# Patient Record
Sex: MALE | Race: OTHER | NOT HISPANIC OR LATINO | Employment: FULL TIME | URBAN - NONMETROPOLITAN AREA
[De-identification: names, ages, dates, MRNs, and addresses within clinical notes are randomized per-mention and may not be internally consistent; named-entity substitution may affect disease eponyms.]

---

## 2023-06-22 ENCOUNTER — OFFICE VISIT (OUTPATIENT)
Dept: PRIMARY CARE | Facility: CLINIC | Age: 53
End: 2023-06-22
Payer: COMMERCIAL

## 2023-06-22 VITALS
BODY MASS INDEX: 31.32 KG/M2 | DIASTOLIC BLOOD PRESSURE: 78 MMHG | WEIGHT: 188 LBS | HEART RATE: 59 BPM | OXYGEN SATURATION: 98 % | SYSTOLIC BLOOD PRESSURE: 128 MMHG | HEIGHT: 65 IN

## 2023-06-22 DIAGNOSIS — K21.9 GASTROESOPHAGEAL REFLUX DISEASE WITHOUT ESOPHAGITIS: Primary | ICD-10-CM

## 2023-06-22 DIAGNOSIS — E78.5 HYPERLIPIDEMIA LDL GOAL <100: ICD-10-CM

## 2023-06-22 DIAGNOSIS — R07.89 OTHER CHEST PAIN: ICD-10-CM

## 2023-06-22 PROBLEM — R73.9 HYPERGLYCEMIA: Status: ACTIVE | Noted: 2023-06-22

## 2023-06-22 PROBLEM — R07.9 CHEST PAIN: Status: ACTIVE | Noted: 2023-06-22

## 2023-06-22 PROCEDURE — 1036F TOBACCO NON-USER: CPT | Performed by: INTERNAL MEDICINE

## 2023-06-22 PROCEDURE — 99214 OFFICE O/P EST MOD 30 MIN: CPT | Performed by: INTERNAL MEDICINE

## 2023-06-22 RX ORDER — FAMOTIDINE 20 MG/1
20 TABLET, FILM COATED ORAL 2 TIMES DAILY
Qty: 180 TABLET | Refills: 1 | Status: CANCELLED | OUTPATIENT
Start: 2023-06-22

## 2023-06-22 RX ORDER — MULTIVITAMIN
1 TABLET ORAL
COMMUNITY

## 2023-06-22 RX ORDER — OMEPRAZOLE 40 MG/1
40 CAPSULE, DELAYED RELEASE ORAL
Qty: 30 CAPSULE | Refills: 2 | Status: SHIPPED | OUTPATIENT
Start: 2023-06-22 | End: 2023-06-22 | Stop reason: SDUPTHER

## 2023-06-22 RX ORDER — OMEPRAZOLE 40 MG/1
40 CAPSULE, DELAYED RELEASE ORAL
Qty: 30 CAPSULE | Refills: 2 | Status: SHIPPED | OUTPATIENT
Start: 2023-06-22 | End: 2023-11-22 | Stop reason: SDUPTHER

## 2023-06-22 RX ORDER — ATORVASTATIN CALCIUM 10 MG/1
10 TABLET, FILM COATED ORAL DAILY
COMMUNITY
Start: 2023-04-14 | End: 2023-06-22 | Stop reason: SDUPTHER

## 2023-06-22 RX ORDER — ATORVASTATIN CALCIUM 10 MG/1
10 TABLET, FILM COATED ORAL DAILY
Qty: 90 TABLET | Refills: 1 | Status: SHIPPED | OUTPATIENT
Start: 2023-06-22 | End: 2023-06-22 | Stop reason: SDUPTHER

## 2023-06-22 RX ORDER — ATORVASTATIN CALCIUM 10 MG/1
10 TABLET, FILM COATED ORAL DAILY
Qty: 90 TABLET | Refills: 1 | Status: SHIPPED | OUTPATIENT
Start: 2023-06-22 | End: 2023-08-01

## 2023-06-22 RX ORDER — FAMOTIDINE 20 MG/1
20 TABLET, FILM COATED ORAL 2 TIMES DAILY
COMMUNITY
End: 2023-06-22

## 2023-06-22 RX ORDER — ALFUZOSIN HYDROCHLORIDE 10 MG/1
10 TABLET, EXTENDED RELEASE ORAL NIGHTLY
COMMUNITY
Start: 2023-01-13 | End: 2024-04-15 | Stop reason: WASHOUT

## 2023-06-22 ASSESSMENT — ENCOUNTER SYMPTOMS
FATIGUE: 0
NAUSEA: 0
SHORTNESS OF BREATH: 0
BLOOD IN STOOL: 0
ABDOMINAL PAIN: 0
PALPITATIONS: 0
COUGH: 0
VOMITING: 0
WHEEZING: 0
DIARRHEA: 0
CHEST TIGHTNESS: 0

## 2023-06-22 NOTE — ASSESSMENT & PLAN NOTE
-His symptoms appear to be more consistent with a gastrointestinal source due to his history of GERD and use of NSAIDs  -I am discontinuing his Pepcid and I will temporarily place him on omeprazole 40 mg twice daily  -He will not take any more NSAIDs  -We are scheduling him for stress test due to his family history and some risk factors

## 2023-06-22 NOTE — ASSESSMENT & PLAN NOTE
-We are providing a refill on his atorvastatin 10 mg daily  -He will go for a fasting lipid profile at his earliest convenience and we will go over the results when he returns

## 2023-06-22 NOTE — PROGRESS NOTES
Pt is here today for a 6 month check up. He was also in the ER on 6/17/23 with the C/O chest pain.

## 2023-06-22 NOTE — PATIENT INSTRUCTIONS
As we discussed I am ordering a stress test to make sure that your heart is okay and the staff will help you get that scheduled with instructions  Please refrain from taking any more NSAIDs which include medications like ibuprofen, Motrin, Advil, Aleve, Naprosyn, naproxen, or aspirin.  If you are needing something for pain you can take Tylenol which is also known as acetaminophen.  Please take it as directed on the box and do not take more than what is recommended  I am having you discontinue your famotidine and instead I sent a new prescription to your pharmacy for medication called omeprazole.  This is a much stronger acid reducing medication and at least temporarily I want you to take this twice a day  Please also remember to get your fasting cholesterol profile completed before your follow-up visit  I want to see you back in follow-up after your stress test

## 2023-06-22 NOTE — PROGRESS NOTES
Subjective   Patient ID: Luke Goldberg is a 52 y.o. male who presents for No chief complaint on file..  HPI  He is here today for follow-up after a recent visit to the emergency department.  He explains that in the wee hours of the night he awoke with chest pain and he described feeling tightness in the center part of his chest as well as laterally.  He states his shoulders also bother him and with deep breaths he was getting more pain.  He states he attempted to take some ibuprofen and aspirin but when he did not get better he appropriately went to the emergency department.  There they did several tests including EKG, chest x-ray, and multiple lab studies.  After being observed for for several hours and determined that it was not likely his heart and he was sent home with instructions to follow-up here.  We did conduct a review of systems and I did review his test results from the emergency department.  We discussed various possible causes of his chest pain and I do strongly suspect gastrointestinal causes.  I did explain however, because of his family history of heart disease I will be ordering a stress test for safe measure.  We are reminded that his father passed away suddenly from heart disease.  Were also reminded that he had a coronary calcium scan back on September 3, 2020 which had a composite score of 0.  He does have several risk factors for heart disease and he understands that that scan is not 100% predictive.  We talked about refraining from any more over-the-counter NSAIDs and I am going to change his Pepcid to omeprazole twice daily until his follow-up visit.  I will give him specific instructions to take with him today.  Review of Systems   Constitutional:  Negative for fatigue.   Respiratory:  Negative for cough, chest tightness, shortness of breath and wheezing.    Cardiovascular:  Negative for chest pain, palpitations and leg swelling.   Gastrointestinal:  Negative for abdominal pain, blood in  stool, diarrhea, nausea and vomiting.     Objective   Physical Exam  Vitals and nursing note reviewed.   Constitutional:       General: He is not in acute distress.     Appearance: Normal appearance.   HENT:      Head: Normocephalic and atraumatic.   Eyes:      Conjunctiva/sclera: Conjunctivae normal.   Cardiovascular:      Rate and Rhythm: Normal rate and regular rhythm.      Heart sounds: Normal heart sounds.   Pulmonary:      Effort: No respiratory distress.      Breath sounds: No wheezing.   Abdominal:      Palpations: Abdomen is soft.      Tenderness: There is no abdominal tenderness. There is no guarding.   Musculoskeletal:         General: No swelling. Normal range of motion.   Skin:     General: Skin is warm and dry.   Neurological:      General: No focal deficit present.      Mental Status: He is alert and oriented to person, place, and time.   Psychiatric:         Behavior: Behavior normal.       Recent Results (from the past 672 hour(s))   D-Dimer, Quantitative VTE Exclusion    Collection Time: 06/17/23  9:50 PM   Result Value Ref Range    D-DIMER, QUANTITATIVE VTE EXCLUSION 230 < or = 500 ng/mL FEU   Basic Metabolic Panel    Collection Time: 06/17/23  9:50 PM   Result Value Ref Range    Glucose 100 (H) 74 - 99 mg/dL    Sodium 137 136 - 145 mmol/L    Potassium 4.1 3.5 - 5.3 mmol/L    Chloride 104 98 - 107 mmol/L    Bicarbonate 26 21 - 32 mmol/L    Anion Gap 11 10 - 20 mmol/L    Urea Nitrogen 12 6 - 23 mg/dL    Creatinine 1.02 0.50 - 1.30 mg/dL    GFR MALE 87 >90 mL/min/1.73m2    Calcium 8.7 8.6 - 10.3 mg/dL   Troponin I, High Sensitivity    Collection Time: 06/17/23  9:50 PM   Result Value Ref Range    Troponin I <3 0 - 20 ng/L   CBC and Auto Differential    Collection Time: 06/17/23  9:50 PM   Result Value Ref Range    WBC 9.6 4.4 - 11.3 x10E9/L    RBC 4.43 (L) 4.50 - 5.90 x10E12/L    Hemoglobin 13.7 13.5 - 17.5 g/dL    Hematocrit 41.6 41.0 - 52.0 %    MCV 94 80 - 100 fL    MCHC 32.9 32.0 - 36.0 g/dL     Platelets 187 150 - 450 x10E9/L    RDW 12.0 11.5 - 14.5 %    Neutrophils % 68.6 40.0 - 80.0 %    Immature Granulocytes %, Automated 0.2 0.0 - 0.9 %    Lymphocytes % 17.7 13.0 - 44.0 %    Monocytes % 13.0 2.0 - 10.0 %    Eosinophils % 0.3 0.0 - 6.0 %    Basophils % 0.2 0.0 - 2.0 %    Neutrophils Absolute 6.60 1.20 - 7.70 x10E9/L    Lymphocytes Absolute 1.70 1.20 - 4.80 x10E9/L    Monocytes Absolute 1.25 (H) 0.10 - 1.00 x10E9/L    Eosinophils Absolute 0.03 0.00 - 0.70 x10E9/L    Basophils Absolute 0.02 0.00 - 0.10 x10E9/L   Troponin I, High Sensitivity    Collection Time: 06/17/23 10:44 PM   Result Value Ref Range    Troponin I <3 0 - 20 ng/L       Assessment/Plan   Problem List Items Addressed This Visit       Chest pain     -His symptoms appear to be more consistent with a gastrointestinal source due to his history of GERD and use of NSAIDs  -I am discontinuing his Pepcid and I will temporarily place him on omeprazole 40 mg twice daily  -He will not take any more NSAIDs  -We are scheduling him for stress test due to his family history and some risk factors         Relevant Orders    Nuclear Stress Test    Hyperlipidemia LDL goal <100     -We are providing a refill on his atorvastatin 10 mg daily  -He will go for a fasting lipid profile at his earliest convenience and we will go over the results when he returns         Relevant Medications    atorvastatin (Lipitor) 10 mg tablet    Other Relevant Orders    Lipid Panel    GERD (gastroesophageal reflux disease) - Primary     -We will discontinue Pepcid  -I will place him on omeprazole 40 mg twice daily until his return visit and then we may reduce to once daily depending his response         Relevant Medications    omeprazole (PriLOSEC) 40 mg DR robert Lau,

## 2023-06-22 NOTE — ASSESSMENT & PLAN NOTE
-We will discontinue Pepcid  -I will place him on omeprazole 40 mg twice daily until his return visit and then we may reduce to once daily depending his response

## 2023-06-26 NOTE — RESULT ENCOUNTER NOTE
I tried to call him about his CT scan results tonight but could not get through  If you could try to call him tomorrow and let him know that his stress test looked okay but he had 2 tiny nodules in the lung which are likely benign but I just want to make sure he follows up here on his scheduled appointment so that we can go into detail about these findings and possible need for follow-up.  Thank you

## 2023-07-13 ENCOUNTER — TELEPHONE (OUTPATIENT)
Dept: PRIMARY CARE | Facility: CLINIC | Age: 53
End: 2023-07-13

## 2023-07-13 ENCOUNTER — APPOINTMENT (OUTPATIENT)
Dept: PRIMARY CARE | Facility: CLINIC | Age: 53
End: 2023-07-13
Payer: COMMERCIAL

## 2023-07-13 DIAGNOSIS — R53.83 FATIGUE, UNSPECIFIED TYPE: Primary | ICD-10-CM

## 2023-07-13 NOTE — TELEPHONE ENCOUNTER
PT ASKING IF YOU WILL ADD A THYROID TO HIS LAB. HIS BROTHER WAS HAVING THE SAME TYPE OF CHEST PAIN AND IT WAS CAUSED BY HIS THYROID LEVEL BEING ABNORMAL.   Dosing Month 5 (Required For Cumulative Dosing): 30mg BID

## 2023-07-13 NOTE — TELEPHONE ENCOUNTER
"Please advise that unfortunately insurance will not cover a TSH with a diagnosis of \"chest pain \".  We will need to have something else such as \"fatigue \"or possibly \"hair loss \".  Does he have any other symptoms?  "

## 2023-07-24 ENCOUNTER — LAB (OUTPATIENT)
Dept: LAB | Facility: LAB | Age: 53
End: 2023-07-24
Payer: COMMERCIAL

## 2023-07-24 DIAGNOSIS — R53.83 FATIGUE, UNSPECIFIED TYPE: ICD-10-CM

## 2023-07-24 DIAGNOSIS — E78.5 HYPERLIPIDEMIA LDL GOAL <100: ICD-10-CM

## 2023-07-24 LAB
CHOLESTEROL (MG/DL) IN SER/PLAS: 185 MG/DL (ref 0–199)
CHOLESTEROL IN HDL (MG/DL) IN SER/PLAS: 40 MG/DL
CHOLESTEROL/HDL RATIO: 4.6
LDL: 95 MG/DL (ref 0–99)
NON HDL CHOLESTEROL: 145 MG/DL
THYROTROPIN (MIU/L) IN SER/PLAS BY DETECTION LIMIT <= 0.05 MIU/L: 3.14 MIU/L (ref 0.44–3.98)
TRIGLYCERIDE (MG/DL) IN SER/PLAS: 251 MG/DL (ref 0–149)
VLDL: 50 MG/DL (ref 0–40)

## 2023-07-24 PROCEDURE — 36415 COLL VENOUS BLD VENIPUNCTURE: CPT

## 2023-07-24 PROCEDURE — 84443 ASSAY THYROID STIM HORMONE: CPT

## 2023-07-24 PROCEDURE — 80061 LIPID PANEL: CPT

## 2023-07-25 ENCOUNTER — APPOINTMENT (OUTPATIENT)
Dept: PRIMARY CARE | Facility: CLINIC | Age: 53
End: 2023-07-25
Payer: COMMERCIAL

## 2023-08-01 ENCOUNTER — OFFICE VISIT (OUTPATIENT)
Dept: PRIMARY CARE | Facility: CLINIC | Age: 53
End: 2023-08-01
Payer: COMMERCIAL

## 2023-08-01 VITALS
DIASTOLIC BLOOD PRESSURE: 78 MMHG | WEIGHT: 189 LBS | HEIGHT: 65 IN | HEART RATE: 70 BPM | SYSTOLIC BLOOD PRESSURE: 118 MMHG | BODY MASS INDEX: 31.49 KG/M2

## 2023-08-01 DIAGNOSIS — E78.5 HYPERLIPIDEMIA LDL GOAL <100: ICD-10-CM

## 2023-08-01 DIAGNOSIS — K21.9 GASTROESOPHAGEAL REFLUX DISEASE WITHOUT ESOPHAGITIS: ICD-10-CM

## 2023-08-01 DIAGNOSIS — R07.89 OTHER CHEST PAIN: ICD-10-CM

## 2023-08-01 DIAGNOSIS — R91.1 PULMONARY NODULE: Primary | ICD-10-CM

## 2023-08-01 PROBLEM — E66.9 OBESITY (BMI 30.0-34.9): Status: ACTIVE | Noted: 2023-08-01

## 2023-08-01 PROBLEM — E66.811 OBESITY (BMI 30.0-34.9): Status: ACTIVE | Noted: 2023-08-01

## 2023-08-01 PROCEDURE — 1036F TOBACCO NON-USER: CPT | Performed by: INTERNAL MEDICINE

## 2023-08-01 PROCEDURE — 99214 OFFICE O/P EST MOD 30 MIN: CPT | Performed by: INTERNAL MEDICINE

## 2023-08-01 RX ORDER — ATORVASTATIN CALCIUM 20 MG/1
20 TABLET, FILM COATED ORAL DAILY
Qty: 30 TABLET | Refills: 5 | Status: SHIPPED | OUTPATIENT
Start: 2023-08-01 | End: 2023-10-13

## 2023-08-01 ASSESSMENT — ENCOUNTER SYMPTOMS
WHEEZING: 0
ARTHRALGIAS: 0
PALPITATIONS: 0
FATIGUE: 0
NAUSEA: 0
BLOOD IN STOOL: 0
CHEST TIGHTNESS: 0
ABDOMINAL PAIN: 0
BACK PAIN: 0
VOMITING: 0
COUGH: 0
DIARRHEA: 0
SHORTNESS OF BREATH: 0

## 2023-08-01 NOTE — PROGRESS NOTES
"Subjective   Patient ID: Luke Goldberg is a 52 y.o. male who presents for No chief complaint on file..  HPI  He is here today for follow-up as he was having some atypical chest pain and because of his family history of heart disease we decided to order a stress test.  I did explain that the stress test came back showing no signs of ischemia.  He is relieved.  I explained that I felt that his symptoms were more or less secondary to gastrointestinal issues and I had prescribed omeprazole 40 mg to replace his Pepcid.  He states he took it for a few weeks but then he felt like it was causing his stomach to \"crumble \".  So he stopped.  He states he is no longer having chest discomfort.  When going to the results of his stress test the radiologist mentioned that he had 2 noncalcified nodules measuring approximately 6 mm.  They indicated that they had more of a benign appearance and he does not smoke.  He states he is very concerned however and I have decided to do a dedicated CT scan to this area.  In looking back he had a CT scan of his chest in 2020 which was his coronary calcium scan and at that time they described a 3 mm nodule.  We will see him back after completion of the study.  Review of Systems   Constitutional:  Negative for fatigue.   Respiratory:  Negative for cough, chest tightness, shortness of breath and wheezing.    Cardiovascular:  Negative for chest pain, palpitations and leg swelling.   Gastrointestinal:  Negative for abdominal pain, blood in stool, diarrhea, nausea and vomiting.   Musculoskeletal:  Negative for arthralgias and back pain.     Objective   Physical Exam  Vitals and nursing note reviewed.   Constitutional:       General: He is not in acute distress.     Appearance: Normal appearance.   HENT:      Head: Normocephalic and atraumatic.   Eyes:      Conjunctiva/sclera: Conjunctivae normal.   Cardiovascular:      Rate and Rhythm: Normal rate and regular rhythm.      Heart sounds: Normal heart " sounds.   Pulmonary:      Effort: No respiratory distress.      Breath sounds: No wheezing.   Abdominal:      Palpations: Abdomen is soft.      Tenderness: There is no abdominal tenderness. There is no guarding.   Musculoskeletal:         General: No swelling. Normal range of motion.   Skin:     General: Skin is warm and dry.   Neurological:      General: No focal deficit present.      Mental Status: He is alert and oriented to person, place, and time.   Psychiatric:         Behavior: Behavior normal.       Recent Results (from the past 672 hour(s))   Lipid Panel    Collection Time: 07/24/23  9:29 AM   Result Value Ref Range    Cholesterol 185 0 - 199 mg/dL    HDL 40.0 mg/dL    Cholesterol/HDL Ratio 4.6     LDL 95 0 - 99 mg/dL    VLDL 50 (H) 0 - 40 mg/dL    Triglycerides 251 (H) 0 - 149 mg/dL    Non HDL Cholesterol 145 mg/dL   TSH    Collection Time: 07/24/23  9:29 AM   Result Value Ref Range    TSH 3.14 0.44 - 3.98 mIU/L       Assessment/Plan          Alisha Lau DO

## 2023-08-01 NOTE — ASSESSMENT & PLAN NOTE
-We will do a dedicated CT scan to his chest due to the mention of pulmonary nodules on his cardiac testing.  They describe to noncalcified nodules measuring 6 mm and back in 2020 he had a CT scan for his heart which showed a 3.7 mm nodule.  -We will see him in follow-up to go over the results

## 2023-08-01 NOTE — ASSESSMENT & PLAN NOTE
-I do encourage him to stay on the omeprazole 40 mg daily for at least the next 6 to 8 weeks and then he can stop the medicine  -He currently denies any chest discomfort or dysphagia symptoms

## 2023-08-01 NOTE — ASSESSMENT & PLAN NOTE
-We reviewed his cholesterol profile and talked about modifying diet to lower triglycerides.  -I have decided to increase his atorvastatin from 10 mg daily up to 20 mg daily

## 2023-08-01 NOTE — PATIENT INSTRUCTIONS
As we discussed your stress test came back showing no signs of ischemia or struggling heart  I decided to increase the dose of your atorvastatin from 10 mg up to 20 mg a day.  You can use up your 10 mg tablets by taking 2 at the very same time and then when you go to the pharmacy there will be a 20 mg dose which she will take 1 tablet once daily  Please try to watch her diet closely  We also discussed the nodule that was identified on your recent cardiac study and therefore I have ordered a CT scan of your lungs.  The staff will help you get that scheduled and I will see you back to go the results  Please stay on the omeprazole 40 mg daily for the next 6 to 8 weeks and if you have recurrence of chest pain I need to hear from you

## 2023-08-01 NOTE — ASSESSMENT & PLAN NOTE
-Recent stress test came back negative for signs of ischemia  -We will continue to aggressively modify his risk factors due to his family history

## 2023-08-29 ENCOUNTER — TELEPHONE (OUTPATIENT)
Dept: PRIMARY CARE | Facility: CLINIC | Age: 53
End: 2023-08-29
Payer: COMMERCIAL

## 2023-09-12 ENCOUNTER — OFFICE VISIT (OUTPATIENT)
Dept: PRIMARY CARE | Facility: CLINIC | Age: 53
End: 2023-09-12
Payer: COMMERCIAL

## 2023-09-12 VITALS
SYSTOLIC BLOOD PRESSURE: 136 MMHG | OXYGEN SATURATION: 99 % | WEIGHT: 190 LBS | HEART RATE: 79 BPM | HEIGHT: 65 IN | DIASTOLIC BLOOD PRESSURE: 88 MMHG | BODY MASS INDEX: 31.65 KG/M2

## 2023-09-12 DIAGNOSIS — R73.9 HYPERGLYCEMIA: ICD-10-CM

## 2023-09-12 DIAGNOSIS — E78.5 HYPERLIPIDEMIA LDL GOAL <100: ICD-10-CM

## 2023-09-12 DIAGNOSIS — R91.1 PULMONARY NODULE: Primary | ICD-10-CM

## 2023-09-12 PROCEDURE — 99213 OFFICE O/P EST LOW 20 MIN: CPT | Performed by: INTERNAL MEDICINE

## 2023-09-12 PROCEDURE — 1036F TOBACCO NON-USER: CPT | Performed by: INTERNAL MEDICINE

## 2023-09-12 ASSESSMENT — ENCOUNTER SYMPTOMS
SHORTNESS OF BREATH: 0
DIARRHEA: 0
FATIGUE: 0
PALPITATIONS: 0
WHEEZING: 0
NAUSEA: 0
COUGH: 0
VOMITING: 0

## 2023-09-12 NOTE — PATIENT INSTRUCTIONS
As we discussed the staff will be helping to schedule a CT scan for 1 year from now as a follow-up to your lung nodules  We are giving you a test called the fecal occult blood test kit and please try to complete this in the next week or so and drop it off at the front office  Please call Dr. Ayala's office to get in for routine checkup  We would like to see you back in approximately 6 months and have ordered fasting lab work to be done just prior to that visit

## 2023-09-12 NOTE — PROGRESS NOTES
Subjective   Patient ID: Luke Goldberg is a 52 y.o. male who presents for No chief complaint on file..  HPI  He is here today for follow-up as we had him go for a CT scan of his lung due to previously identified pulmonary nodules.  The good news is the radiologist does not express a high level of concern about these findings but they are recommending that we do a follow-up CT scan in 1 year.  He is agreeable and we will get that scheduled.  We also discussed colorectal cancer screening and we talked about the various modalities.  He has opted to do the fecal occult blood test kit and we will give that to him today.  He will try to turn it in the next week or so.  We decided that we could safely see him back in 6 months for reevaluation and he is reminded to call Dr. Ayala's office to get his routine checkup.  Review of Systems   Constitutional:  Negative for fatigue.   Respiratory:  Negative for cough, shortness of breath and wheezing.    Cardiovascular:  Negative for chest pain, palpitations and leg swelling.   Gastrointestinal:  Negative for diarrhea, nausea and vomiting.     Objective   Physical Exam  Vitals and nursing note reviewed.   Constitutional:       General: He is not in acute distress.     Appearance: Normal appearance.   HENT:      Head: Normocephalic and atraumatic.   Eyes:      Conjunctiva/sclera: Conjunctivae normal.   Cardiovascular:      Rate and Rhythm: Normal rate and regular rhythm.      Heart sounds: Normal heart sounds.   Pulmonary:      Effort: No respiratory distress.      Breath sounds: No wheezing.   Abdominal:      Palpations: Abdomen is soft.      Tenderness: There is no abdominal tenderness. There is no guarding.   Musculoskeletal:         General: No swelling. Normal range of motion.   Skin:     General: Skin is warm and dry.   Neurological:      General: No focal deficit present.      Mental Status: He is alert and oriented to person, place, and time.   Psychiatric:         Behavior:  Behavior normal.       Assessment/Plan   Problem List Items Addressed This Visit       Hyperlipidemia LDL goal <100    Relevant Orders    Lipid panel    Hyperglycemia    Relevant Orders    Basic Metabolic Panel    Hemoglobin A1C    Pulmonary nodule - Primary    Relevant Orders    CT lung screening follow up CT chest wo IV contrast          Alisha Lau,

## 2023-10-12 DIAGNOSIS — E78.5 HYPERLIPIDEMIA LDL GOAL <100: ICD-10-CM

## 2023-10-13 RX ORDER — ATORVASTATIN CALCIUM 20 MG/1
20 TABLET, FILM COATED ORAL DAILY
Qty: 90 TABLET | Refills: 1 | Status: SHIPPED | OUTPATIENT
Start: 2023-10-13

## 2023-11-22 ENCOUNTER — OFFICE VISIT (OUTPATIENT)
Dept: PRIMARY CARE | Facility: CLINIC | Age: 53
End: 2023-11-22
Payer: COMMERCIAL

## 2023-11-22 VITALS
BODY MASS INDEX: 32.45 KG/M2 | HEART RATE: 100 BPM | SYSTOLIC BLOOD PRESSURE: 128 MMHG | DIASTOLIC BLOOD PRESSURE: 78 MMHG | OXYGEN SATURATION: 98 % | WEIGHT: 195 LBS

## 2023-11-22 DIAGNOSIS — J06.9 UPPER RESPIRATORY TRACT INFECTION, UNSPECIFIED TYPE: Primary | ICD-10-CM

## 2023-11-22 DIAGNOSIS — K21.9 GASTROESOPHAGEAL REFLUX DISEASE WITHOUT ESOPHAGITIS: ICD-10-CM

## 2023-11-22 PROBLEM — R07.9 CHEST PAIN: Status: RESOLVED | Noted: 2023-06-22 | Resolved: 2023-11-22

## 2023-11-22 PROCEDURE — 1036F TOBACCO NON-USER: CPT | Performed by: INTERNAL MEDICINE

## 2023-11-22 PROCEDURE — 99213 OFFICE O/P EST LOW 20 MIN: CPT | Performed by: INTERNAL MEDICINE

## 2023-11-22 RX ORDER — OMEPRAZOLE 40 MG/1
40 CAPSULE, DELAYED RELEASE ORAL
Qty: 30 CAPSULE | Refills: 5 | Status: SHIPPED | OUTPATIENT
Start: 2023-11-22 | End: 2024-11-21

## 2023-11-22 RX ORDER — AZITHROMYCIN 250 MG/1
TABLET, FILM COATED ORAL
Qty: 6 TABLET | Refills: 0 | Status: SHIPPED | OUTPATIENT
Start: 2023-11-22 | End: 2023-11-27

## 2023-11-22 RX ORDER — BENZONATATE 200 MG/1
200 CAPSULE ORAL 3 TIMES DAILY PRN
Qty: 42 CAPSULE | Refills: 0 | Status: SHIPPED | OUTPATIENT
Start: 2023-11-22 | End: 2023-12-22

## 2023-11-22 ASSESSMENT — ENCOUNTER SYMPTOMS
FATIGUE: 1
SINUS PAIN: 1
WHEEZING: 1
COUGH: 1
SINUS PRESSURE: 1
SORE THROAT: 0
SHORTNESS OF BREATH: 1
FEVER: 0

## 2023-11-22 NOTE — PROGRESS NOTES
Subjective   Patient ID: Luke Goldberg is a 53 y.o. male who presents for Cough (Dry cough, started three days ago. ).  He is here today with a 2-week history of upper respiratory symptoms including cough and nasal congestion.  He also complains of having some sinus pressure.  We did conduct a review of systems and based on today's presentation I do feel that his condition warrants antibiotic treatment.  We are also giving him Tessalon for his cough and he will contact us if he is not getting better.  I also gave him another fecal occult blood test kit and he is agreed to turn that in next week.  Cough  Associated symptoms include postnasal drip, shortness of breath and wheezing. Pertinent negatives include no fever or sore throat.     Review of Systems   Constitutional:  Positive for fatigue. Negative for fever.   HENT:  Positive for congestion, postnasal drip, sinus pressure and sinus pain. Negative for sore throat.    Respiratory:  Positive for cough, shortness of breath and wheezing.      Objective   Physical Exam  Vitals and nursing note reviewed.   Constitutional:       General: He is not in acute distress.     Appearance: Normal appearance.   HENT:      Head: Normocephalic and atraumatic.   Eyes:      Conjunctiva/sclera: Conjunctivae normal.   Cardiovascular:      Rate and Rhythm: Normal rate and regular rhythm.      Heart sounds: Normal heart sounds.   Pulmonary:      Effort: No respiratory distress.      Breath sounds: No wheezing.   Abdominal:      Palpations: Abdomen is soft.      Tenderness: There is no abdominal tenderness. There is no guarding.   Musculoskeletal:         General: No swelling. Normal range of motion.   Skin:     General: Skin is warm and dry.   Neurological:      General: No focal deficit present.      Mental Status: He is alert and oriented to person, place, and time.   Psychiatric:         Behavior: Behavior normal.       Assessment/Plan   Problem List Items Addressed This Visit              ICD-10-CM    GERD (gastroesophageal reflux disease) K21.9    Relevant Medications    omeprazole (PriLOSEC) 40 mg DR capsule    Upper respiratory tract infection - Primary J06.9     -I am prescribing a Z-Segun  -Tessalon for cough  -He will call if his condition does not improve in the next 10 to 14 days         Relevant Medications    azithromycin (Zithromax) 250 mg tablet    benzonatate (Tessalon) 200 mg capsule          Alisha Lau DO

## 2023-11-22 NOTE — ASSESSMENT & PLAN NOTE
-I am prescribing a Z-Segun  -Tessalon for cough  -He will call if his condition does not improve in the next 10 to 14 days

## 2023-11-22 NOTE — PATIENT INSTRUCTIONS
As we discussed I have sent 3 prescriptions to your pharmacy.  1 is for the antibiotic called a Z-Segun and even though you take it for 5 days it persists in your system for 10.  I am also giving you the cough medicine called Tessalon and you can take that as directed.  Please call me if your condition is not resolving but understand it could take anywhere from 10 to 14 days to clear  I also sent a refill on your omeprazole 40 mg to be taken once daily.  If you find you will need to take it a couple of times a week that is fine but you want to stay ahead of your acid reflux symptoms  Please remember to watch her diet  Please remember to complete that fecal occult blood test kit and drop it off next week and as soon as it comes back I will call you with results

## 2023-12-26 ENCOUNTER — HOSPITAL ENCOUNTER (EMERGENCY)
Facility: HOSPITAL | Age: 53
Discharge: HOME | End: 2023-12-26
Attending: EMERGENCY MEDICINE
Payer: COMMERCIAL

## 2023-12-26 VITALS
HEART RATE: 70 BPM | OXYGEN SATURATION: 97 % | WEIGHT: 185 LBS | TEMPERATURE: 97.8 F | SYSTOLIC BLOOD PRESSURE: 135 MMHG | HEIGHT: 67 IN | DIASTOLIC BLOOD PRESSURE: 82 MMHG | RESPIRATION RATE: 16 BRPM | BODY MASS INDEX: 29.03 KG/M2

## 2023-12-26 DIAGNOSIS — M54.6 ACUTE BILATERAL THORACIC BACK PAIN: Primary | ICD-10-CM

## 2023-12-26 PROCEDURE — 99284 EMERGENCY DEPT VISIT MOD MDM: CPT | Performed by: EMERGENCY MEDICINE

## 2023-12-26 PROCEDURE — 2500000005 HC RX 250 GENERAL PHARMACY W/O HCPCS: Performed by: EMERGENCY MEDICINE

## 2023-12-26 PROCEDURE — 2500000001 HC RX 250 WO HCPCS SELF ADMINISTERED DRUGS (ALT 637 FOR MEDICARE OP): Performed by: EMERGENCY MEDICINE

## 2023-12-26 PROCEDURE — 96372 THER/PROPH/DIAG INJ SC/IM: CPT | Performed by: EMERGENCY MEDICINE

## 2023-12-26 PROCEDURE — 2500000004 HC RX 250 GENERAL PHARMACY W/ HCPCS (ALT 636 FOR OP/ED): Performed by: EMERGENCY MEDICINE

## 2023-12-26 PROCEDURE — 96372 THER/PROPH/DIAG INJ SC/IM: CPT

## 2023-12-26 PROCEDURE — 99283 EMERGENCY DEPT VISIT LOW MDM: CPT | Mod: 25 | Performed by: EMERGENCY MEDICINE

## 2023-12-26 RX ORDER — MORPHINE SULFATE 4 MG/ML
4 INJECTION, SOLUTION INTRAMUSCULAR; INTRAVENOUS ONCE
Status: COMPLETED | OUTPATIENT
Start: 2023-12-26 | End: 2023-12-26

## 2023-12-26 RX ORDER — CYCLOBENZAPRINE HCL 5 MG
5 TABLET ORAL 3 TIMES DAILY PRN
Qty: 12 TABLET | Refills: 0 | Status: SHIPPED | OUTPATIENT
Start: 2023-12-26 | End: 2024-01-04 | Stop reason: ALTCHOICE

## 2023-12-26 RX ORDER — ORPHENADRINE CITRATE 30 MG/ML
60 INJECTION INTRAMUSCULAR; INTRAVENOUS ONCE
Status: COMPLETED | OUTPATIENT
Start: 2023-12-26 | End: 2023-12-26

## 2023-12-26 RX ORDER — KETOROLAC TROMETHAMINE 10 MG/1
10 TABLET, FILM COATED ORAL EVERY 6 HOURS PRN
Qty: 20 TABLET | Refills: 0 | Status: SHIPPED | OUTPATIENT
Start: 2023-12-26 | End: 2024-01-04 | Stop reason: WASHOUT

## 2023-12-26 RX ORDER — ONDANSETRON 4 MG/1
4 TABLET, ORALLY DISINTEGRATING ORAL ONCE
Status: COMPLETED | OUTPATIENT
Start: 2023-12-26 | End: 2023-12-26

## 2023-12-26 RX ORDER — KETOROLAC TROMETHAMINE 30 MG/ML
15 INJECTION, SOLUTION INTRAMUSCULAR; INTRAVENOUS ONCE
Status: COMPLETED | OUTPATIENT
Start: 2023-12-26 | End: 2023-12-26

## 2023-12-26 RX ORDER — CYCLOBENZAPRINE HCL 10 MG
5 TABLET ORAL ONCE
Status: COMPLETED | OUTPATIENT
Start: 2023-12-26 | End: 2023-12-26

## 2023-12-26 RX ADMIN — KETOROLAC TROMETHAMINE 15 MG: 30 INJECTION, SOLUTION INTRAMUSCULAR; INTRAVENOUS at 17:49

## 2023-12-26 RX ADMIN — CYCLOBENZAPRINE 5 MG: 10 TABLET, FILM COATED ORAL at 17:47

## 2023-12-26 RX ADMIN — ONDANSETRON 4 MG: 4 TABLET, ORALLY DISINTEGRATING ORAL at 18:50

## 2023-12-26 RX ADMIN — ORPHENADRINE CITRATE 60 MG: 60 INJECTION INTRAMUSCULAR; INTRAVENOUS at 18:44

## 2023-12-26 RX ADMIN — MORPHINE SULFATE 4 MG: 4 INJECTION, SOLUTION INTRAMUSCULAR; INTRAVENOUS at 18:44

## 2023-12-26 ASSESSMENT — PAIN - FUNCTIONAL ASSESSMENT: PAIN_FUNCTIONAL_ASSESSMENT: 0-10

## 2023-12-26 ASSESSMENT — PAIN DESCRIPTION - ORIENTATION: ORIENTATION: RIGHT;LEFT;UPPER

## 2023-12-26 ASSESSMENT — COLUMBIA-SUICIDE SEVERITY RATING SCALE - C-SSRS
6. HAVE YOU EVER DONE ANYTHING, STARTED TO DO ANYTHING, OR PREPARED TO DO ANYTHING TO END YOUR LIFE?: NO
2. HAVE YOU ACTUALLY HAD ANY THOUGHTS OF KILLING YOURSELF?: NO
1. IN THE PAST MONTH, HAVE YOU WISHED YOU WERE DEAD OR WISHED YOU COULD GO TO SLEEP AND NOT WAKE UP?: NO

## 2023-12-26 ASSESSMENT — PAIN DESCRIPTION - LOCATION: LOCATION: BACK

## 2023-12-26 ASSESSMENT — PAIN SCALES - GENERAL
PAINLEVEL_OUTOF10: 7
PAINLEVEL_OUTOF10: 9
PAINLEVEL_OUTOF10: 9

## 2023-12-26 NOTE — ED PROVIDER NOTES
"HPI   Chief Complaint   Patient presents with    Back Pain     Patient ambulatory to ED with c/o upper back pain \"both of my shoulder blades\"-- onset yesterday. Pain worse with any type of movement. Denies known injury. Using Lidocaine patches without relief. C/o unable to sleep last evening due to pain discomfort.       Limitations to History: None    HPI: 53-year-old male presents with back pain.  Bilateral upper thoracic back pain.  Present since yesterday.  Worse with movement.  Worse with raising his arms.  Worse with twisting.  Denies any chest pain, shortness of breath, nausea, vomiting, diaphoresis.  Denies any fall or trauma.  Did carry some folding chairs yesterday.    ------------------------------------------------------------------------------------------------------------------------------------------  Physical Exam:    VS: As documented in the triage note and EMR flowsheet from this visit were reviewed.    Appearance: Alert. cooperative,  in no acute distress.   Skin: Intact,  dry skin, no lesions, rash, petechiae or purpura.    HENT: Normocephalic, atraumatic. Nares patent. No intraoral lesions.   Neck: Supple, without meningismus. Trachea at midline. No lymphadenopathy.  Pulmonary: Clear bilaterally with good chest wall excursion. No rales, rhonchi or wheezing. No accessory muscle use or stridor.  Cardiac: Regular rate and rhythm, no rubs, murmurs, or gallops. No JVD, Carotids without bruits.  Abdomen: Abdomen is soft, nontender, and nondistended.  No palpable organomegaly.  No rebound or guarding.  No CVA tenderness. Nonsurgical abdomen  Genitourinary: Exam deferred.  Musculoskeletal: Full range of motion.  Pulses full and equal. No cyanosis, clubbing, or edema.  Tenderness to palpation in the bilateral parathoracic musculature.  Neurological:  Cranial nerves are grossly intact, grossly normal sensation, no weakness, no focal findings identified.  Psychiatric: Appropriate mood and " affect.                            [place] Coma Scale Score: 15                  Patient History   Past Medical History:   Diagnosis Date    GERD (gastroesophageal reflux disease)     Hyperlipidemia      Past Surgical History:   Procedure Laterality Date    BICEPS TENDON REPAIR      OTHER SURGICAL HISTORY  04/13/2018    Wrist Surgery     Family History   Problem Relation Name Age of Onset    No Known Problems Mother      No Known Problems Father       Social History     Tobacco Use    Smoking status: Never    Smokeless tobacco: Never   Substance Use Topics    Alcohol use: Yes    Drug use: Not Currently       Physical Exam   ED Triage Vitals [12/26/23 1723]   Temp Heart Rate Resp BP   36.6 °C (97.8 °F) 89 18 (!) 136/93      SpO2 Temp Source Heart Rate Source Patient Position   97 % Oral Monitor Sitting      BP Location FiO2 (%)     Right arm --       Physical Exam    ED Course & MDM   Diagnoses as of 12/26/23 1908   Acute bilateral thoracic back pain       Medical Decision Making  Medical Decision Making:    Patient appears well nontoxic.  Tenderness palpation of the bilateral parathoracic musculature.  Patient initially treated with intramuscular Toradol and oral cyclobenzaprine.  Patient continues to have pain and was treated with intramuscular morphine as well as Norflex.  Feeling much improved on reevaluation.  Patient will be treated with oral ketorolac and cyclobenzaprine at home.  Advised on follow-up with primary care.  Stable at time of discharge.    Differential Diagnoses Considered: Muscle strain, ACS    Escalation of Care:  Appropriate for discharge and follow-up with primary care.    Prescription Drug Consideration: Oral ketorolac and cyclobenzaprine.            Procedure  Procedures     Haris Lindsay,   12/27/23 4901

## 2024-01-04 ENCOUNTER — OFFICE VISIT (OUTPATIENT)
Dept: PRIMARY CARE | Facility: CLINIC | Age: 54
End: 2024-01-04
Payer: COMMERCIAL

## 2024-01-04 VITALS
WEIGHT: 190.4 LBS | DIASTOLIC BLOOD PRESSURE: 72 MMHG | HEART RATE: 86 BPM | BODY MASS INDEX: 29.82 KG/M2 | OXYGEN SATURATION: 96 % | SYSTOLIC BLOOD PRESSURE: 122 MMHG

## 2024-01-04 DIAGNOSIS — R25.2 LEG CRAMPS: Primary | ICD-10-CM

## 2024-01-04 DIAGNOSIS — R06.2 WHEEZING: ICD-10-CM

## 2024-01-04 PROBLEM — J06.9 UPPER RESPIRATORY TRACT INFECTION: Status: RESOLVED | Noted: 2023-11-22 | Resolved: 2024-01-04

## 2024-01-04 PROCEDURE — 99213 OFFICE O/P EST LOW 20 MIN: CPT | Performed by: INTERNAL MEDICINE

## 2024-01-04 PROCEDURE — 1036F TOBACCO NON-USER: CPT | Performed by: INTERNAL MEDICINE

## 2024-01-04 ASSESSMENT — ENCOUNTER SYMPTOMS
BLOOD IN STOOL: 0
NAUSEA: 0
VOMITING: 0
BACK PAIN: 1
PALPITATIONS: 0
SHORTNESS OF BREATH: 0
ARTHRALGIAS: 0
ABDOMINAL PAIN: 0
CHEST TIGHTNESS: 0
FATIGUE: 0
DIARRHEA: 0
COUGH: 0

## 2024-01-04 NOTE — PROGRESS NOTES
Subjective   Patient ID: Luke Goldberg is a 53 y.o. male who presents for hosp discharge (Both shoulder blades-2/10).  HPI  He is here today for follow-up after a recent visit to the emergency department.  He went there the day after [place].  He was having spasms in his upper thoracic back below and at the level of both shoulder blades and he states it was radiating down to the sides.  It was affected with movement and if he would twist it would precipitate the symptoms.  If he was reaching out with his arm he would precipitate the symptoms.  He states that he is frustrated because he has gained weight in the recent past and has been trying to cut back.  He also states he feels like he has been sick this past year more frequently than he should but he does have small children who come home with respiratory infections.  We did conduct a review of systems and based on his symptoms I decided to order a chest x-ray as well as laboratory testing.  We also discussed maintaining good hydration.  We will see him back in follow-up and he is reminded to complete his FOBT kit.  Review of Systems   Constitutional:  Negative for fatigue.   Respiratory:  Negative for cough, chest tightness and shortness of breath.         Occ wheezing   Cardiovascular:  Negative for chest pain, palpitations and leg swelling.   Gastrointestinal:  Negative for abdominal pain, blood in stool, diarrhea, nausea and vomiting.   Musculoskeletal:  Positive for back pain. Negative for arthralgias.     Objective   Physical Exam  Vitals and nursing note reviewed.   Constitutional:       General: He is not in acute distress.     Appearance: Normal appearance.   HENT:      Head: Normocephalic and atraumatic.   Eyes:      Conjunctiva/sclera: Conjunctivae normal.   Cardiovascular:      Rate and Rhythm: Normal rate and regular rhythm.      Heart sounds: Normal heart sounds.   Pulmonary:      Effort: No respiratory distress.      Breath sounds: No wheezing.    Abdominal:      Palpations: Abdomen is soft.      Tenderness: There is no abdominal tenderness. There is no guarding.   Musculoskeletal:         General: No swelling. Normal range of motion.   Skin:     General: Skin is warm and dry.   Neurological:      General: No focal deficit present.      Mental Status: He is alert and oriented to person, place, and time.   Psychiatric:         Behavior: Behavior normal.       Assessment/Plan   Problem List Items Addressed This Visit             ICD-10-CM    Leg cramps - Primary R25.2     -We will check electrolytes and I encouraged him to stay well-hydrated         Relevant Orders    Comprehensive Metabolic Panel    CBC and Auto Differential    Wheezing R06.2     -I am ordering a chest x-ray to assess his recent symptoms         Relevant Orders    XR chest 2 views          Alisha Lau, DO

## 2024-01-04 NOTE — PATIENT INSTRUCTIONS
Because of your various symptoms of cramping I decided to stop checked several labs that will include checking your electrolytes such as your potassium level and also we will check your blood count and blood sugar.  Please try to go and get your lab work done at your earliest convenience and we would prefer that you be fasting due to the fact that there is a blood sugar and there  I also ordered an x-ray of your chest and you can go at any time to get this done  I will see you back in follow-up and please remember to complete your FOBT kit for colon cancer screening.  If you lost the kit we can replace it.

## 2024-01-08 ENCOUNTER — LAB (OUTPATIENT)
Dept: LAB | Facility: LAB | Age: 54
End: 2024-01-08
Payer: COMMERCIAL

## 2024-01-08 ENCOUNTER — HOSPITAL ENCOUNTER (OUTPATIENT)
Dept: RADIOLOGY | Facility: HOSPITAL | Age: 54
Discharge: HOME | End: 2024-01-08
Payer: COMMERCIAL

## 2024-01-08 DIAGNOSIS — R25.2 LEG CRAMPS: ICD-10-CM

## 2024-01-08 DIAGNOSIS — R06.2 WHEEZING: ICD-10-CM

## 2024-01-08 LAB
ALBUMIN SERPL BCP-MCNC: 4.2 G/DL (ref 3.4–5)
ALP SERPL-CCNC: 43 U/L (ref 33–120)
ALT SERPL W P-5'-P-CCNC: 24 U/L (ref 10–52)
ANION GAP SERPL CALC-SCNC: 11 MMOL/L (ref 10–20)
AST SERPL W P-5'-P-CCNC: 21 U/L (ref 9–39)
BASOPHILS # BLD AUTO: 0.03 X10*3/UL (ref 0–0.1)
BASOPHILS NFR BLD AUTO: 0.5 %
BILIRUB SERPL-MCNC: 0.6 MG/DL (ref 0–1.2)
BUN SERPL-MCNC: 11 MG/DL (ref 6–23)
CALCIUM SERPL-MCNC: 9.2 MG/DL (ref 8.6–10.3)
CHLORIDE SERPL-SCNC: 106 MMOL/L (ref 98–107)
CO2 SERPL-SCNC: 28 MMOL/L (ref 21–32)
CREAT SERPL-MCNC: 0.86 MG/DL (ref 0.5–1.3)
EGFRCR SERPLBLD CKD-EPI 2021: >90 ML/MIN/1.73M*2
EOSINOPHIL # BLD AUTO: 0.11 X10*3/UL (ref 0–0.7)
EOSINOPHIL NFR BLD AUTO: 1.7 %
ERYTHROCYTE [DISTWIDTH] IN BLOOD BY AUTOMATED COUNT: 12.3 % (ref 11.5–14.5)
GLUCOSE SERPL-MCNC: 91 MG/DL (ref 74–99)
HCT VFR BLD AUTO: 48 % (ref 41–52)
HGB BLD-MCNC: 15.5 G/DL (ref 13.5–17.5)
IMM GRANULOCYTES # BLD AUTO: 0.01 X10*3/UL (ref 0–0.7)
IMM GRANULOCYTES NFR BLD AUTO: 0.2 % (ref 0–0.9)
LYMPHOCYTES # BLD AUTO: 2.7 X10*3/UL (ref 1.2–4.8)
LYMPHOCYTES NFR BLD AUTO: 42.3 %
MCH RBC QN AUTO: 29.8 PG (ref 26–34)
MCHC RBC AUTO-ENTMCNC: 32.3 G/DL (ref 32–36)
MCV RBC AUTO: 92 FL (ref 80–100)
MONOCYTES # BLD AUTO: 0.71 X10*3/UL (ref 0.1–1)
MONOCYTES NFR BLD AUTO: 11.1 %
NEUTROPHILS # BLD AUTO: 2.82 X10*3/UL (ref 1.2–7.7)
NEUTROPHILS NFR BLD AUTO: 44.2 %
NRBC BLD-RTO: 0 /100 WBCS (ref 0–0)
PLATELET # BLD AUTO: 246 X10*3/UL (ref 150–450)
POTASSIUM SERPL-SCNC: 4.7 MMOL/L (ref 3.5–5.3)
PROT SERPL-MCNC: 6.6 G/DL (ref 6.4–8.2)
RBC # BLD AUTO: 5.2 X10*6/UL (ref 4.5–5.9)
SODIUM SERPL-SCNC: 140 MMOL/L (ref 136–145)
WBC # BLD AUTO: 6.4 X10*3/UL (ref 4.4–11.3)

## 2024-01-08 PROCEDURE — 71046 X-RAY EXAM CHEST 2 VIEWS: CPT

## 2024-01-08 PROCEDURE — 80053 COMPREHEN METABOLIC PANEL: CPT

## 2024-01-08 PROCEDURE — 85025 COMPLETE CBC W/AUTO DIFF WBC: CPT

## 2024-01-08 PROCEDURE — 71046 X-RAY EXAM CHEST 2 VIEWS: CPT | Performed by: STUDENT IN AN ORGANIZED HEALTH CARE EDUCATION/TRAINING PROGRAM

## 2024-01-08 PROCEDURE — 36415 COLL VENOUS BLD VENIPUNCTURE: CPT

## 2024-01-15 ENCOUNTER — APPOINTMENT (OUTPATIENT)
Dept: PRIMARY CARE | Facility: CLINIC | Age: 54
End: 2024-01-15
Payer: COMMERCIAL

## 2024-01-22 ENCOUNTER — OFFICE VISIT (OUTPATIENT)
Dept: PRIMARY CARE | Facility: CLINIC | Age: 54
End: 2024-01-22
Payer: COMMERCIAL

## 2024-01-22 VITALS
WEIGHT: 192.8 LBS | SYSTOLIC BLOOD PRESSURE: 130 MMHG | HEART RATE: 97 BPM | OXYGEN SATURATION: 99 % | DIASTOLIC BLOOD PRESSURE: 99 MMHG | BODY MASS INDEX: 30.2 KG/M2

## 2024-01-22 DIAGNOSIS — B37.2 YEAST DERMATITIS: ICD-10-CM

## 2024-01-22 DIAGNOSIS — R03.0 ELEVATED BLOOD PRESSURE READING: ICD-10-CM

## 2024-01-22 DIAGNOSIS — E78.5 HYPERLIPIDEMIA LDL GOAL <100: Primary | ICD-10-CM

## 2024-01-22 PROBLEM — R91.1 PULMONARY NODULE: Status: RESOLVED | Noted: 2023-08-01 | Resolved: 2024-01-22

## 2024-01-22 PROBLEM — R06.2 WHEEZING: Status: RESOLVED | Noted: 2024-01-04 | Resolved: 2024-01-22

## 2024-01-22 PROBLEM — N52.9 ERECTILE DYSFUNCTION: Status: RESOLVED | Noted: 2024-01-22 | Resolved: 2024-01-22

## 2024-01-22 PROBLEM — I15.9 SECONDARY HYPERTENSION: Status: RESOLVED | Noted: 2024-01-22 | Resolved: 2024-01-22

## 2024-01-22 PROBLEM — I15.9 SECONDARY HYPERTENSION: Status: ACTIVE | Noted: 2024-01-22

## 2024-01-22 PROCEDURE — 99213 OFFICE O/P EST LOW 20 MIN: CPT | Performed by: INTERNAL MEDICINE

## 2024-01-22 PROCEDURE — 1036F TOBACCO NON-USER: CPT | Performed by: INTERNAL MEDICINE

## 2024-01-22 RX ORDER — CLOTRIMAZOLE AND BETAMETHASONE DIPROPIONATE 10; .64 MG/G; MG/G
1 CREAM TOPICAL 2 TIMES DAILY
Qty: 30 G | Refills: 2 | Status: SHIPPED | OUTPATIENT
Start: 2024-01-22 | End: 2024-02-01

## 2024-01-22 ASSESSMENT — ENCOUNTER SYMPTOMS
ABDOMINAL PAIN: 0
DIARRHEA: 0
BLOOD IN STOOL: 0
BACK PAIN: 0
ARTHRALGIAS: 0
SHORTNESS OF BREATH: 0
COUGH: 0
WHEEZING: 0
PALPITATIONS: 0
VOMITING: 0
FEVER: 0
NAUSEA: 0

## 2024-01-22 NOTE — PATIENT INSTRUCTIONS
As we discussed I sent a prescription to your pharmacy for medication called Lotrisone to be applied to your patches of skin changes  Please let me know if this is not effective  Please remember to check your blood pressure at home with your personal device when you have the opportunity to sit and relax for 10 to 20 minutes and please give me an update  Please also bring your machine with you  Please also remember to turn in your FOBT kit ASAP  To see you in March and as you know we want you to get a fasting cholesterol profile just prior to that visit.

## 2024-01-22 NOTE — ASSESSMENT & PLAN NOTE
His blood pressure was a typically elevated today but he states he thinks it is because he had an argument on the phone with a coworker  -He has agreed to check his blood pressures at home with his personal device and will give me an update and we will be seeing him back in mid March for follow-up  -He is reminded to bring his machine with him   Nostril Rim Text: The closure involved the nostril rim, WHICH IS A FREE MARGIN AND THEREFORE INDICATION FOR COMPLEX REPAIR.

## 2024-01-22 NOTE — PROGRESS NOTES
"Subjective   Patient ID: Luke Goldberg is a 53 y.o. male who presents for Follow-up (Xray/labs).  HPI  He is here today for follow-up and he reports that his symptoms have improved significantly.  He states he sometimes feels a \"knot \"in the left upper posterior shoulder blade but his symptoms have improved significantly since his ER visit on December 26.  We are reminded that he had a negative coronary calcium scan back in 2020 and he also had a stress test performed back in June 2023.  His chest x-ray is clear and his laboratory test results are looking good.  We did review the results together.  He also had an elevated blood pressure when he arrived today and he states he got on the phone and had an argument with a coworker.  After checking it his diastolic is still high.  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  He has agreed to check his blood pressure at home when he is after he sit relax for 10 to 20 minutes and he will be coming back in March so I have asked that he bring that machine with him to his follow-up visit.  Again I remind him to turn in that FOBT kit ASAP.  He also has what appears to be a possible yeast dermatitis on his skin involving his left hand.  He states he has had this on and off for years and prescription I given him in the past has helped clear it.  He states it seems to happen more in the winter.  I am giving him Lotrisone and he will call me if this is not effective then not clearing up his rash.  Review of Systems   Constitutional:  Negative for fever.   Respiratory:  Negative for cough, shortness of breath and wheezing.         CORONA   Cardiovascular:  Negative for chest pain, palpitations and leg swelling.   Gastrointestinal:  Negative for abdominal pain, blood in stool, diarrhea, nausea and vomiting.   Musculoskeletal:  Negative for arthralgias and back pain.     Objective   Physical Exam  Vitals and nursing note " reviewed.   Constitutional:       General: He is not in acute distress.     Appearance: Normal appearance.   HENT:      Head: Normocephalic and atraumatic.   Eyes:      Conjunctiva/sclera: Conjunctivae normal.   Cardiovascular:      Rate and Rhythm: Normal rate and regular rhythm.      Heart sounds: Normal heart sounds.   Pulmonary:      Effort: No respiratory distress.      Breath sounds: No wheezing.   Abdominal:      Palpations: Abdomen is soft.      Tenderness: There is no abdominal tenderness. There is no guarding.   Musculoskeletal:         General: No swelling. Normal range of motion.   Skin:     General: Skin is warm and dry.   Neurological:      General: No focal deficit present.      Mental Status: He is alert and oriented to person, place, and time.   Psychiatric:         Behavior: Behavior normal.       Recent Results (from the past 672 hour(s))   Comprehensive Metabolic Panel    Collection Time: 01/08/24 10:16 AM   Result Value Ref Range    Glucose 91 74 - 99 mg/dL    Sodium 140 136 - 145 mmol/L    Potassium 4.7 3.5 - 5.3 mmol/L    Chloride 106 98 - 107 mmol/L    Bicarbonate 28 21 - 32 mmol/L    Anion Gap 11 10 - 20 mmol/L    Urea Nitrogen 11 6 - 23 mg/dL    Creatinine 0.86 0.50 - 1.30 mg/dL    eGFR >90 >60 mL/min/1.73m*2    Calcium 9.2 8.6 - 10.3 mg/dL    Albumin 4.2 3.4 - 5.0 g/dL    Alkaline Phosphatase 43 33 - 120 U/L    Total Protein 6.6 6.4 - 8.2 g/dL    AST 21 9 - 39 U/L    Bilirubin, Total 0.6 0.0 - 1.2 mg/dL    ALT 24 10 - 52 U/L   CBC and Auto Differential    Collection Time: 01/08/24 10:16 AM   Result Value Ref Range    WBC 6.4 4.4 - 11.3 x10*3/uL    nRBC 0.0 0.0 - 0.0 /100 WBCs    RBC 5.20 4.50 - 5.90 x10*6/uL    Hemoglobin 15.5 13.5 - 17.5 g/dL    Hematocrit 48.0 41.0 - 52.0 %    MCV 92 80 - 100 fL    MCH 29.8 26.0 - 34.0 pg    MCHC 32.3 32.0 - 36.0 g/dL    RDW 12.3 11.5 - 14.5 %    Platelets 246 150 - 450 x10*3/uL    Neutrophils % 44.2 40.0 - 80.0 %    Immature Granulocytes %, Automated  0.2 0.0 - 0.9 %    Lymphocytes % 42.3 13.0 - 44.0 %    Monocytes % 11.1 2.0 - 10.0 %    Eosinophils % 1.7 0.0 - 6.0 %    Basophils % 0.5 0.0 - 2.0 %    Neutrophils Absolute 2.82 1.20 - 7.70 x10*3/uL    Immature Granulocytes Absolute, Automated 0.01 0.00 - 0.70 x10*3/uL    Lymphocytes Absolute 2.70 1.20 - 4.80 x10*3/uL    Monocytes Absolute 0.71 0.10 - 1.00 x10*3/uL    Eosinophils Absolute 0.11 0.00 - 0.70 x10*3/uL    Basophils Absolute 0.03 0.00 - 0.10 x10*3/uL       Assessment/Plan   Problem List Items Addressed This Visit             ICD-10-CM    Hyperlipidemia LDL goal <100 - Primary E78.5     -He is coming back in mid March and he will get his cholesterol checked just prior to that visit         Elevated blood pressure reading R03.0     His blood pressure was a typically elevated today but he states he thinks it is because he had an argument on the phone with a coworker  -He has agreed to check his blood pressures at home with his personal device and will give me an update and we will be seeing him back in mid March for follow-up  -He is reminded to bring his machine with him         Yeast dermatitis B37.2    Relevant Medications    clotrimazole-betamethasone (Lotrisone) cream          Alisha Lau, DO

## 2024-02-01 ENCOUNTER — HOSPITAL ENCOUNTER (EMERGENCY)
Facility: HOSPITAL | Age: 54
Discharge: HOME | End: 2024-02-01
Payer: COMMERCIAL

## 2024-02-01 VITALS
WEIGHT: 185 LBS | OXYGEN SATURATION: 94 % | HEART RATE: 77 BPM | SYSTOLIC BLOOD PRESSURE: 118 MMHG | TEMPERATURE: 98 F | RESPIRATION RATE: 16 BRPM | BODY MASS INDEX: 29.73 KG/M2 | HEIGHT: 66 IN | DIASTOLIC BLOOD PRESSURE: 72 MMHG

## 2024-02-01 DIAGNOSIS — R19.7 VOMITING AND DIARRHEA: Primary | ICD-10-CM

## 2024-02-01 DIAGNOSIS — R11.10 VOMITING AND DIARRHEA: Primary | ICD-10-CM

## 2024-02-01 DIAGNOSIS — B09 VIRAL EXANTHEM: ICD-10-CM

## 2024-02-01 DIAGNOSIS — E86.0 DEHYDRATION: ICD-10-CM

## 2024-02-01 LAB
ALBUMIN SERPL BCP-MCNC: 3.9 G/DL (ref 3.4–5)
ALP SERPL-CCNC: 41 U/L (ref 33–120)
ALT SERPL W P-5'-P-CCNC: 24 U/L (ref 10–52)
ANION GAP SERPL CALC-SCNC: 11 MMOL/L (ref 10–20)
AST SERPL W P-5'-P-CCNC: 17 U/L (ref 9–39)
BASOPHILS # BLD AUTO: 0.01 X10*3/UL (ref 0–0.1)
BASOPHILS NFR BLD AUTO: 0.2 %
BILIRUB DIRECT SERPL-MCNC: 0.1 MG/DL (ref 0–0.3)
BILIRUB SERPL-MCNC: 0.7 MG/DL (ref 0–1.2)
BUN SERPL-MCNC: 13 MG/DL (ref 6–23)
CALCIUM SERPL-MCNC: 8.2 MG/DL (ref 8.6–10.3)
CHLORIDE SERPL-SCNC: 106 MMOL/L (ref 98–107)
CO2 SERPL-SCNC: 23 MMOL/L (ref 21–32)
CREAT SERPL-MCNC: 0.87 MG/DL (ref 0.5–1.3)
EGFRCR SERPLBLD CKD-EPI 2021: >90 ML/MIN/1.73M*2
EOSINOPHIL # BLD AUTO: 0 X10*3/UL (ref 0–0.7)
EOSINOPHIL NFR BLD AUTO: 0 %
ERYTHROCYTE [DISTWIDTH] IN BLOOD BY AUTOMATED COUNT: 12.5 % (ref 11.5–14.5)
FLUAV RNA RESP QL NAA+PROBE: NOT DETECTED
FLUBV RNA RESP QL NAA+PROBE: NOT DETECTED
GLUCOSE SERPL-MCNC: 109 MG/DL (ref 74–99)
HCT VFR BLD AUTO: 49.2 % (ref 41–52)
HGB BLD-MCNC: 16.1 G/DL (ref 13.5–17.5)
IMM GRANULOCYTES # BLD AUTO: 0.01 X10*3/UL (ref 0–0.7)
IMM GRANULOCYTES NFR BLD AUTO: 0.2 % (ref 0–0.9)
LIPASE SERPL-CCNC: 13 U/L (ref 9–82)
LYMPHOCYTES # BLD AUTO: 0.7 X10*3/UL (ref 1.2–4.8)
LYMPHOCYTES NFR BLD AUTO: 11.7 %
MCH RBC QN AUTO: 29.8 PG (ref 26–34)
MCHC RBC AUTO-ENTMCNC: 32.7 G/DL (ref 32–36)
MCV RBC AUTO: 91 FL (ref 80–100)
MONOCYTES # BLD AUTO: 0.42 X10*3/UL (ref 0.1–1)
MONOCYTES NFR BLD AUTO: 7 %
NEUTROPHILS # BLD AUTO: 4.82 X10*3/UL (ref 1.2–7.7)
NEUTROPHILS NFR BLD AUTO: 80.9 %
NRBC BLD-RTO: 0 /100 WBCS (ref 0–0)
PLATELET # BLD AUTO: 199 X10*3/UL (ref 150–450)
POTASSIUM SERPL-SCNC: 3.8 MMOL/L (ref 3.5–5.3)
PROT SERPL-MCNC: 6.6 G/DL (ref 6.4–8.2)
RBC # BLD AUTO: 5.41 X10*6/UL (ref 4.5–5.9)
SARS-COV-2 RNA RESP QL NAA+PROBE: NOT DETECTED
SODIUM SERPL-SCNC: 136 MMOL/L (ref 136–145)
WBC # BLD AUTO: 6 X10*3/UL (ref 4.4–11.3)

## 2024-02-01 PROCEDURE — 85025 COMPLETE CBC W/AUTO DIFF WBC: CPT | Performed by: PHYSICIAN ASSISTANT

## 2024-02-01 PROCEDURE — 87636 SARSCOV2 & INF A&B AMP PRB: CPT | Performed by: PHYSICIAN ASSISTANT

## 2024-02-01 PROCEDURE — 80053 COMPREHEN METABOLIC PANEL: CPT | Performed by: PHYSICIAN ASSISTANT

## 2024-02-01 PROCEDURE — 96375 TX/PRO/DX INJ NEW DRUG ADDON: CPT

## 2024-02-01 PROCEDURE — 99284 EMERGENCY DEPT VISIT MOD MDM: CPT

## 2024-02-01 PROCEDURE — 36415 COLL VENOUS BLD VENIPUNCTURE: CPT | Performed by: PHYSICIAN ASSISTANT

## 2024-02-01 PROCEDURE — 83690 ASSAY OF LIPASE: CPT | Performed by: PHYSICIAN ASSISTANT

## 2024-02-01 PROCEDURE — 96374 THER/PROPH/DIAG INJ IV PUSH: CPT

## 2024-02-01 PROCEDURE — 96361 HYDRATE IV INFUSION ADD-ON: CPT

## 2024-02-01 PROCEDURE — 84075 ASSAY ALKALINE PHOSPHATASE: CPT | Performed by: PHYSICIAN ASSISTANT

## 2024-02-01 PROCEDURE — 2500000004 HC RX 250 GENERAL PHARMACY W/ HCPCS (ALT 636 FOR OP/ED): Performed by: PHYSICIAN ASSISTANT

## 2024-02-01 RX ORDER — ONDANSETRON 4 MG/1
4 TABLET, ORALLY DISINTEGRATING ORAL EVERY 8 HOURS PRN
Qty: 9 TABLET | Refills: 0 | Status: SHIPPED | OUTPATIENT
Start: 2024-02-01 | End: 2024-02-04

## 2024-02-01 RX ORDER — ONDANSETRON HYDROCHLORIDE 2 MG/ML
4 INJECTION, SOLUTION INTRAVENOUS ONCE
Status: COMPLETED | OUTPATIENT
Start: 2024-02-01 | End: 2024-02-01

## 2024-02-01 RX ORDER — KETOROLAC TROMETHAMINE 30 MG/ML
30 INJECTION, SOLUTION INTRAMUSCULAR; INTRAVENOUS ONCE
Status: COMPLETED | OUTPATIENT
Start: 2024-02-01 | End: 2024-02-01

## 2024-02-01 RX ORDER — FAMOTIDINE 10 MG/ML
40 INJECTION INTRAVENOUS ONCE
Status: COMPLETED | OUTPATIENT
Start: 2024-02-01 | End: 2024-02-01

## 2024-02-01 RX ORDER — KETOROLAC TROMETHAMINE 30 MG/ML
30 INJECTION, SOLUTION INTRAMUSCULAR; INTRAVENOUS ONCE
Status: DISCONTINUED | OUTPATIENT
Start: 2024-02-01 | End: 2024-02-01

## 2024-02-01 RX ADMIN — FAMOTIDINE 40 MG: 10 INJECTION, SOLUTION INTRAVENOUS at 13:07

## 2024-02-01 RX ADMIN — ONDANSETRON 4 MG: 2 INJECTION, SOLUTION INTRAMUSCULAR; INTRAVENOUS at 12:40

## 2024-02-01 RX ADMIN — KETOROLAC TROMETHAMINE 30 MG: 30 INJECTION, SOLUTION INTRAMUSCULAR; INTRAVENOUS at 14:11

## 2024-02-01 RX ADMIN — SODIUM CHLORIDE 1000 ML: 9 INJECTION, SOLUTION INTRAVENOUS at 13:02

## 2024-02-01 ASSESSMENT — ENCOUNTER SYMPTOMS
ABDOMINAL PAIN: 0
CHILLS: 0
BACK PAIN: 0
ARTHRALGIAS: 0
SORE THROAT: 0
SHORTNESS OF BREATH: 0
VOMITING: 1
COUGH: 0
DIARRHEA: 1
FEVER: 0
SEIZURES: 0
PALPITATIONS: 0
HEMATURIA: 0
DYSURIA: 0
EYE PAIN: 0
NAUSEA: 1
COLOR CHANGE: 0

## 2024-02-01 ASSESSMENT — COLUMBIA-SUICIDE SEVERITY RATING SCALE - C-SSRS
1. IN THE PAST MONTH, HAVE YOU WISHED YOU WERE DEAD OR WISHED YOU COULD GO TO SLEEP AND NOT WAKE UP?: NO
6. HAVE YOU EVER DONE ANYTHING, STARTED TO DO ANYTHING, OR PREPARED TO DO ANYTHING TO END YOUR LIFE?: NO
2. HAVE YOU ACTUALLY HAD ANY THOUGHTS OF KILLING YOURSELF?: NO

## 2024-02-01 ASSESSMENT — PAIN - FUNCTIONAL ASSESSMENT: PAIN_FUNCTIONAL_ASSESSMENT: 0-10

## 2024-02-01 ASSESSMENT — PAIN SCALES - GENERAL: PAINLEVEL_OUTOF10: 8

## 2024-02-01 NOTE — ED PROVIDER NOTES
Patient is a 53-year-old male who presents to the emergency department with a chief complaint of nausea, vomiting, diarrhea, chills, and headache.  He states that his symptoms began around 3:00 this morning.  He states that he has had numerous amounts of vomiting and diarrhea.  He also reports of a headache that developed yesterday evening. He denies his headache being thunderclap or the worst headache of his life. He states that it is a constant throbbing. No change in vision. No neck pain. No injury to his head.   He states that he only has discomfort in his abdomen when he is about to vomit otherwise denies abdominal pain. Denies any abdominal pain at this time. He states that his pain is relieved by vomiting or a bowel movement.  He denies any blood in his stool or dark or tarry stool. He denies chest pain or shortness of breath           Review of Systems   Constitutional:  Negative for chills and fever.   HENT:  Negative for ear pain and sore throat.    Eyes:  Negative for pain and visual disturbance.   Respiratory:  Negative for cough and shortness of breath.    Cardiovascular:  Negative for chest pain and palpitations.   Gastrointestinal:  Positive for diarrhea, nausea and vomiting. Negative for abdominal pain.   Genitourinary:  Negative for dysuria and hematuria.   Musculoskeletal:  Negative for arthralgias and back pain.   Skin:  Negative for color change and rash.   Neurological:  Negative for seizures and syncope.   All other systems reviewed and are negative.       Physical Exam  Vitals and nursing note reviewed.   Constitutional:       General: He is not in acute distress.     Appearance: He is well-developed. He is not ill-appearing.   HENT:      Head: Normocephalic and atraumatic.      Nose: Nose normal.      Mouth/Throat:      Pharynx: No oropharyngeal exudate or posterior oropharyngeal erythema.   Eyes:      Extraocular Movements: Extraocular movements intact.      Conjunctiva/sclera: Conjunctivae  normal.      Pupils: Pupils are equal, round, and reactive to light.   Cardiovascular:      Rate and Rhythm: Normal rate and regular rhythm.      Heart sounds: No murmur heard.  Pulmonary:      Effort: Pulmonary effort is normal. No respiratory distress.      Breath sounds: Normal breath sounds. No stridor. No wheezing or rhonchi.   Abdominal:      General: There is no distension.      Palpations: Abdomen is soft. There is no mass.      Tenderness: There is no abdominal tenderness. There is no guarding or rebound.      Hernia: No hernia is present.   Musculoskeletal:         General: No swelling. Normal range of motion.      Cervical back: Normal range of motion and neck supple. No rigidity.   Skin:     General: Skin is warm and dry.      Capillary Refill: Capillary refill takes less than 2 seconds.   Neurological:      General: No focal deficit present.      Mental Status: He is alert and oriented to person, place, and time.      GCS: GCS eye subscore is 4. GCS verbal subscore is 5. GCS motor subscore is 6.      Cranial Nerves: Cranial nerves 2-12 are intact. No cranial nerve deficit, dysarthria or facial asymmetry.      Sensory: Sensation is intact. No sensory deficit.      Motor: Motor function is intact. No weakness.      Coordination: Coordination normal.      Gait: Gait is intact.   Psychiatric:         Mood and Affect: Mood normal.          Labs Reviewed   CBC WITH AUTO DIFFERENTIAL - Abnormal       Result Value    WBC 6.0      nRBC 0.0      RBC 5.41      Hemoglobin 16.1      Hematocrit 49.2      MCV 91      MCH 29.8      MCHC 32.7      RDW 12.5      Platelets 199      Neutrophils % 80.9      Immature Granulocytes %, Automated 0.2      Lymphocytes % 11.7      Monocytes % 7.0      Eosinophils % 0.0      Basophils % 0.2      Neutrophils Absolute 4.82      Immature Granulocytes Absolute, Automated 0.01      Lymphocytes Absolute 0.70 (*)     Monocytes Absolute 0.42      Eosinophils Absolute 0.00      Basophils  Absolute 0.01     BASIC METABOLIC PANEL - Abnormal    Glucose 109 (*)     Sodium 136      Potassium 3.8      Chloride 106      Bicarbonate 23      Anion Gap 11      Urea Nitrogen 13      Creatinine 0.87      eGFR >90      Calcium 8.2 (*)    LIPASE - Normal    Lipase 13      Narrative:     Venipuncture immediately after or during the administration of Metamizole may lead to falsely low results. Testing should be performed immediately prior to Metamizole dosing.   HEPATIC FUNCTION PANEL - Normal    Albumin 3.9      Bilirubin, Total 0.7      Bilirubin, Direct 0.1      Alkaline Phosphatase 41      ALT 24      AST 17      Total Protein 6.6     SARS-COV-2 AND INFLUENZA A/B PCR - Normal    Flu A Result Not Detected      Flu B Result Not Detected      Coronavirus 2019, PCR Not Detected      Narrative:     This assay has received FDA Emergency Use Authorization (EUA) and  is only authorized for the duration of time that circumstances exist to justify the authorization of the emergency use of in vitro diagnostic tests for the detection of SARS-CoV-2 virus and/or diagnosis of COVID-19 infection under section 564(b)(1) of the Act, 21 U.S.C. 360bbb-3(b)(1). Testing for SARS-CoV-2 is only recommended for patients who meet current clinical and/or epidemiological criteria as defined by federal, state, or local public health directives. This assay is an in vitro diagnostic nucleic acid amplification test for the qualitative detection of SARS-CoV-2, Influenza A, and Influenza B from nasopharyngeal specimens and has been validated for use at [facility]. Negative results do not preclude COVID-19 infections or Influenza A/B infections, and should not be used as the sole basis for diagnosis, treatment, or other management decisions. If Influenza A/B and RSV PCR results are negative, testing for Parainfluenza virus, Adenovirus and Metapneumovirus is routinely performed for [facility] [place]  "inpatients, and is available on other patients by placing an add-on request.         No orders to display        Procedures     Medical Decision Making  Patient is afebrile and nontoxic-appearing.  Presents with flulike symptoms.  Onset was around 3:00 this morning.  He reports nausea, vomiting, diarrhea, chills, and headache.  He reported abdominal pain only prior to vomiting.  Currently denies any abdominal pain.  His abdomen is soft and nontender. He also complains of a mild headache. Denies it being a thunderclap headache, sudden onset, the worst headache of his life, or any change in vision. Improved with IV fluids. No meningismus. Given patient's presentation and benign physician exam no indication for imaging at this time.  Laboratory studies obtained which are unremarkable.  He was treated symptomatically with IV fluids for dehydration and also given zofran,, Toradol, and Pepcid for his additional symptoms.  He does report that he is feeling \"much better.\" Patient will be discharged home with recommended follow-up with PCP and return for any new or worsening symptoms.    Differential diagnosis includes but not limited to viral illness, gastroenteritis, COVID, influenza, dehydration, viral exanthem, intracranial hemorrhage    Amount and/or Complexity of Data Reviewed  Labs: ordered. Decision-making details documented in ED Course.    Risk  Prescription drug management.         Diagnoses as of 02/01/24 1423   Vomiting and diarrhea   Viral exanthem   Dehydration                    Mylene Choudhury PA-C  02/01/24 1423    "

## 2024-03-12 ENCOUNTER — APPOINTMENT (OUTPATIENT)
Dept: PRIMARY CARE | Facility: CLINIC | Age: 54
End: 2024-03-12
Payer: COMMERCIAL

## 2024-04-12 ENCOUNTER — HOSPITAL ENCOUNTER (EMERGENCY)
Facility: HOSPITAL | Age: 54
Discharge: HOME | End: 2024-04-12
Payer: COMMERCIAL

## 2024-04-12 ENCOUNTER — APPOINTMENT (OUTPATIENT)
Dept: RADIOLOGY | Facility: HOSPITAL | Age: 54
End: 2024-04-12
Payer: COMMERCIAL

## 2024-04-12 ENCOUNTER — APPOINTMENT (OUTPATIENT)
Dept: CARDIOLOGY | Facility: HOSPITAL | Age: 54
End: 2024-04-12
Payer: COMMERCIAL

## 2024-04-12 VITALS
HEART RATE: 75 BPM | TEMPERATURE: 98.2 F | RESPIRATION RATE: 20 BRPM | OXYGEN SATURATION: 100 % | HEIGHT: 67 IN | BODY MASS INDEX: 29.03 KG/M2 | DIASTOLIC BLOOD PRESSURE: 84 MMHG | SYSTOLIC BLOOD PRESSURE: 127 MMHG | WEIGHT: 185 LBS

## 2024-04-12 DIAGNOSIS — R91.8 PULMONARY NODULES: Primary | ICD-10-CM

## 2024-04-12 DIAGNOSIS — R07.9 CHEST PAIN, UNSPECIFIED TYPE: ICD-10-CM

## 2024-04-12 LAB
ALBUMIN SERPL BCP-MCNC: 4 G/DL (ref 3.4–5)
ALP SERPL-CCNC: 43 U/L (ref 33–120)
ALT SERPL W P-5'-P-CCNC: 21 U/L (ref 10–52)
ANION GAP SERPL CALC-SCNC: 9 MMOL/L (ref 10–20)
APTT PPP: 32 SECONDS (ref 27–38)
AST SERPL W P-5'-P-CCNC: 15 U/L (ref 9–39)
BASOPHILS # BLD AUTO: 0.03 X10*3/UL (ref 0–0.1)
BASOPHILS NFR BLD AUTO: 0.4 %
BILIRUB SERPL-MCNC: 0.5 MG/DL (ref 0–1.2)
BUN SERPL-MCNC: 12 MG/DL (ref 6–23)
CALCIUM SERPL-MCNC: 8.7 MG/DL (ref 8.6–10.3)
CARDIAC TROPONIN I PNL SERPL HS: <3 NG/L (ref 0–20)
CARDIAC TROPONIN I PNL SERPL HS: <3 NG/L (ref 0–20)
CHLORIDE SERPL-SCNC: 103 MMOL/L (ref 98–107)
CO2 SERPL-SCNC: 29 MMOL/L (ref 21–32)
CREAT SERPL-MCNC: 0.85 MG/DL (ref 0.5–1.3)
D DIMER PPP FEU-MCNC: <215 NG/ML FEU
EGFRCR SERPLBLD CKD-EPI 2021: >90 ML/MIN/1.73M*2
EOSINOPHIL # BLD AUTO: 0.06 X10*3/UL (ref 0–0.7)
EOSINOPHIL NFR BLD AUTO: 0.9 %
ERYTHROCYTE [DISTWIDTH] IN BLOOD BY AUTOMATED COUNT: 12.5 % (ref 11.5–14.5)
FLUAV RNA RESP QL NAA+PROBE: NOT DETECTED
FLUBV RNA RESP QL NAA+PROBE: NOT DETECTED
GLUCOSE SERPL-MCNC: 86 MG/DL (ref 74–99)
HCT VFR BLD AUTO: 47.7 % (ref 41–52)
HGB BLD-MCNC: 15.7 G/DL (ref 13.5–17.5)
IMM GRANULOCYTES # BLD AUTO: 0.02 X10*3/UL (ref 0–0.7)
IMM GRANULOCYTES NFR BLD AUTO: 0.3 % (ref 0–0.9)
INR PPP: 1 (ref 0.9–1.1)
LYMPHOCYTES # BLD AUTO: 2.22 X10*3/UL (ref 1.2–4.8)
LYMPHOCYTES NFR BLD AUTO: 32.1 %
MAGNESIUM SERPL-MCNC: 2.17 MG/DL (ref 1.6–2.4)
MCH RBC QN AUTO: 30.4 PG (ref 26–34)
MCHC RBC AUTO-ENTMCNC: 32.9 G/DL (ref 32–36)
MCV RBC AUTO: 92 FL (ref 80–100)
MONOCYTES # BLD AUTO: 0.66 X10*3/UL (ref 0.1–1)
MONOCYTES NFR BLD AUTO: 9.6 %
NEUTROPHILS # BLD AUTO: 3.92 X10*3/UL (ref 1.2–7.7)
NEUTROPHILS NFR BLD AUTO: 56.7 %
NRBC BLD-RTO: 0 /100 WBCS (ref 0–0)
PLATELET # BLD AUTO: 219 X10*3/UL (ref 150–450)
POTASSIUM SERPL-SCNC: 4.3 MMOL/L (ref 3.5–5.3)
PROT SERPL-MCNC: 6.6 G/DL (ref 6.4–8.2)
PROTHROMBIN TIME: 11.2 SECONDS (ref 9.8–12.8)
RBC # BLD AUTO: 5.16 X10*6/UL (ref 4.5–5.9)
SARS-COV-2 RNA RESP QL NAA+PROBE: NOT DETECTED
SODIUM SERPL-SCNC: 137 MMOL/L (ref 136–145)
WBC # BLD AUTO: 6.9 X10*3/UL (ref 4.4–11.3)

## 2024-04-12 PROCEDURE — 85025 COMPLETE CBC W/AUTO DIFF WBC: CPT | Performed by: PHYSICIAN ASSISTANT

## 2024-04-12 PROCEDURE — 84484 ASSAY OF TROPONIN QUANT: CPT | Performed by: PHYSICIAN ASSISTANT

## 2024-04-12 PROCEDURE — 2500000004 HC RX 250 GENERAL PHARMACY W/ HCPCS (ALT 636 FOR OP/ED): Performed by: PHYSICIAN ASSISTANT

## 2024-04-12 PROCEDURE — 85379 FIBRIN DEGRADATION QUANT: CPT | Performed by: PHYSICIAN ASSISTANT

## 2024-04-12 PROCEDURE — 84075 ASSAY ALKALINE PHOSPHATASE: CPT | Performed by: PHYSICIAN ASSISTANT

## 2024-04-12 PROCEDURE — 99285 EMERGENCY DEPT VISIT HI MDM: CPT | Mod: 25

## 2024-04-12 PROCEDURE — 36415 COLL VENOUS BLD VENIPUNCTURE: CPT | Performed by: PHYSICIAN ASSISTANT

## 2024-04-12 PROCEDURE — 71275 CT ANGIOGRAPHY CHEST: CPT

## 2024-04-12 PROCEDURE — 83735 ASSAY OF MAGNESIUM: CPT | Performed by: PHYSICIAN ASSISTANT

## 2024-04-12 PROCEDURE — 71045 X-RAY EXAM CHEST 1 VIEW: CPT

## 2024-04-12 PROCEDURE — 96374 THER/PROPH/DIAG INJ IV PUSH: CPT | Mod: 59

## 2024-04-12 PROCEDURE — 85730 THROMBOPLASTIN TIME PARTIAL: CPT | Performed by: PHYSICIAN ASSISTANT

## 2024-04-12 PROCEDURE — 2550000001 HC RX 255 CONTRASTS: Performed by: PHYSICIAN ASSISTANT

## 2024-04-12 PROCEDURE — 87636 SARSCOV2 & INF A&B AMP PRB: CPT | Performed by: PHYSICIAN ASSISTANT

## 2024-04-12 PROCEDURE — 71275 CT ANGIOGRAPHY CHEST: CPT | Mod: FOREIGN READ | Performed by: RADIOLOGY

## 2024-04-12 PROCEDURE — 85610 PROTHROMBIN TIME: CPT | Performed by: PHYSICIAN ASSISTANT

## 2024-04-12 PROCEDURE — 93005 ELECTROCARDIOGRAM TRACING: CPT

## 2024-04-12 PROCEDURE — 71045 X-RAY EXAM CHEST 1 VIEW: CPT | Mod: FOREIGN READ | Performed by: RADIOLOGY

## 2024-04-12 RX ORDER — KETOROLAC TROMETHAMINE 30 MG/ML
30 INJECTION, SOLUTION INTRAMUSCULAR; INTRAVENOUS ONCE
Status: COMPLETED | OUTPATIENT
Start: 2024-04-12 | End: 2024-04-12

## 2024-04-12 RX ORDER — CYCLOBENZAPRINE HCL 10 MG
10 TABLET ORAL 3 TIMES DAILY PRN
Qty: 9 TABLET | Refills: 0 | Status: SHIPPED | OUTPATIENT
Start: 2024-04-12 | End: 2024-06-06 | Stop reason: SDUPTHER

## 2024-04-12 RX ADMIN — KETOROLAC TROMETHAMINE 30 MG: 30 INJECTION, SOLUTION INTRAMUSCULAR at 21:53

## 2024-04-12 RX ADMIN — IOHEXOL 68 ML: 350 INJECTION, SOLUTION INTRAVENOUS at 18:58

## 2024-04-12 ASSESSMENT — HEART SCORE
HEART SCORE: 2
HISTORY: SLIGHTLY SUSPICIOUS
RISK FACTORS: 1-2 RISK FACTORS
AGE: 45-64
TROPONIN: LESS THAN OR EQUAL TO NORMAL LIMIT
ECG: NORMAL

## 2024-04-12 ASSESSMENT — ENCOUNTER SYMPTOMS
ARTHRALGIAS: 0
EYE PAIN: 0
COUGH: 0
FEVER: 0
PALPITATIONS: 0
VOMITING: 0
BACK PAIN: 0
SHORTNESS OF BREATH: 0
COLOR CHANGE: 0
SEIZURES: 0
ABDOMINAL PAIN: 0
HEMATURIA: 0
CHILLS: 0
DYSURIA: 0
SORE THROAT: 0

## 2024-04-12 ASSESSMENT — COLUMBIA-SUICIDE SEVERITY RATING SCALE - C-SSRS
2. HAVE YOU ACTUALLY HAD ANY THOUGHTS OF KILLING YOURSELF?: NO
1. IN THE PAST MONTH, HAVE YOU WISHED YOU WERE DEAD OR WISHED YOU COULD GO TO SLEEP AND NOT WAKE UP?: NO
6. HAVE YOU EVER DONE ANYTHING, STARTED TO DO ANYTHING, OR PREPARED TO DO ANYTHING TO END YOUR LIFE?: NO

## 2024-04-12 ASSESSMENT — PAIN SCALES - GENERAL
PAINLEVEL_OUTOF10: 6
PAINLEVEL_OUTOF10: 4

## 2024-04-12 ASSESSMENT — PAIN - FUNCTIONAL ASSESSMENT: PAIN_FUNCTIONAL_ASSESSMENT: 0-10

## 2024-04-12 NOTE — ED PROVIDER NOTES
Patient is a 53-year-old male who presents to the emergency room with a chief complaint of chest pain.  Patient states that he has had left shoulder pain for approximately 3 to 4 days.  He states that a few hours prior to arrival he has pain radiating to the front of his chest.  He states that his pain is worse with movement.  He denies any shortness of breath.  He states he has a slight cough.  He denies any history of hypertension.  He does have hyperlipidemia.  No previous cardiac history.  He denies smoking.  Does state that he has some family history.  No injury to his chest.           Review of Systems   Constitutional:  Negative for chills and fever.   HENT:  Negative for ear pain and sore throat.    Eyes:  Negative for pain and visual disturbance.   Respiratory:  Negative for cough and shortness of breath.    Cardiovascular:  Negative for chest pain and palpitations.   Gastrointestinal:  Negative for abdominal pain and vomiting.   Genitourinary:  Negative for dysuria and hematuria.   Musculoskeletal:  Negative for arthralgias and back pain.   Skin:  Negative for color change and rash.   Neurological:  Negative for seizures and syncope.   All other systems reviewed and are negative.       Physical Exam     Labs Reviewed   COMPREHENSIVE METABOLIC PANEL - Abnormal       Result Value    Glucose 86      Sodium 137      Potassium 4.3      Chloride 103      Bicarbonate 29      Anion Gap 9 (*)     Urea Nitrogen 12      Creatinine 0.85      eGFR >90      Calcium 8.7      Albumin 4.0      Alkaline Phosphatase 43      Total Protein 6.6      AST 15      Bilirubin, Total 0.5      ALT 21     MAGNESIUM - Normal    Magnesium 2.17     APTT - Normal    aPTT 32      Narrative:     The APTT is no longer used for monitoring Unfractionated Heparin Therapy. For monitoring Heparin Therapy, use the Heparin Assay.   PROTIME-INR - Normal    Protime 11.2      INR 1.0     SARS-COV-2 AND INFLUENZA A/B PCR - Normal    Flu A Result Not  Detected      Flu B Result Not Detected      Coronavirus 2019, PCR Not Detected      Narrative:     This assay has received FDA Emergency Use Authorization (EUA) and  is only authorized for the duration of time that circumstances exist to justify the authorization of the emergency use of in vitro diagnostic tests for the detection of SARS-CoV-2 virus and/or diagnosis of COVID-19 infection under section 564(b)(1) of the Act, 21 U.S.C. 360bbb-3(b)(1). Testing for SARS-CoV-2 is only recommended for patients who meet current clinical and/or epidemiological criteria as defined by federal, state, or local public health directives. This assay is an in vitro diagnostic nucleic acid amplification test for the qualitative detection of SARS-CoV-2, Influenza A, and Influenza B from nasopharyngeal specimens and has been validated for use at [facility]. Negative results do not preclude COVID-19 infections or Influenza A/B infections, and should not be used as the sole basis for diagnosis, treatment, or other management decisions. If Influenza A/B and RSV PCR results are negative, testing for Parainfluenza virus, Adenovirus and Metapneumovirus is routinely performed for [facility] pediatric oncology and intensive care inpatients, and is available on other patients by placing an add-on request.    D-DIMER, VTE EXCLUSION - Normal    D-Dimer, Quantitative VTE Exclusion <215      Narrative:     The VTE Exclusion D-Dimer assay is reported in ng/mL Fibrinogen Equivalent Units (FEU).    Per 's instructions for use, a value of less than 500 ng/mL (FEU) may help to exclude DVT or PE in outpatients when the assay is used with a clinical pretest probability assessment.([facility] must utilize and document eCalc 'Wells Score Deep Vein Thrombosis Risk' for DVT exclusion only. Emergency Department should utilize  Guidelines for Emergency Department Use of the VTE Exclusion D-Dimer and Clinical Pretest probability  assessment model for DVT or PE exclusion.)   SERIAL TROPONIN-INITIAL - Normal    Troponin I, High Sensitivity <3      Narrative:     Less than 99th percentile of normal range cutoff-  Female and children under 18 years old <14 ng/L; Male <21 ng/L: Negative  Repeat testing should be performed if clinically indicated.     Female and children under 18 years old 14-50 ng/L; Male 21-50 ng/L:  Consistent with possible cardiac damage and possible increased clinical   risk. Serial measurements may help to assess extent of myocardial damage.     >50 ng/L: Consistent with cardiac damage, increased clinical risk and  myocardial infarction. Serial measurements may help assess extent of   myocardial damage.      NOTE: Children less than 1 year old may have higher baseline troponin   levels and results should be interpreted in conjunction with the overall   clinical context.     NOTE: Troponin I testing is performed using a different   testing methodology at [facility] than at other   [facility]. Direct result comparisons should only   be made within the same method.   SERIAL TROPONIN, 1 HOUR - Normal    Troponin I, High Sensitivity <3      Narrative:     Less than 99th percentile of normal range cutoff-  Female and children under 18 years old <14 ng/L; Male <21 ng/L: Negative  Repeat testing should be performed if clinically indicated.     Female and children under 18 years old 14-50 ng/L; Male 21-50 ng/L:  Consistent with possible cardiac damage and possible increased clinical   risk. Serial measurements may help to assess extent of myocardial damage.     >50 ng/L: Consistent with cardiac damage, increased clinical risk and  myocardial infarction. Serial measurements may help assess extent of   myocardial damage.      NOTE: Children less than 1 year old may have higher baseline troponin   levels and results should be interpreted in conjunction with the overall   clinical context.     NOTE: Troponin I testing  is performed using a different   testing methodology at [facility] than at other   [facility]. Direct result comparisons should only   be made within the same method.   TROPONIN SERIES- (INITIAL, 1 HR)    Narrative:     The following orders were created for panel order Troponin I Series, High Sensitivity (0, 1 HR).  Procedure                               Abnormality         Status                     ---------                               -----------         ------                     Troponin I, High Sensiti...[[id number]]  Normal              Final result               Troponin, High Sensitivi...[[id number]]  Normal              Final result                 Please view results for these tests on the individual orders.   CBC WITH AUTO DIFFERENTIAL    WBC 6.9      nRBC 0.0      RBC 5.16      Hemoglobin 15.7      Hematocrit 47.7      MCV 92      MCH 30.4      MCHC 32.9      RDW 12.5      Platelets 219      Neutrophils % 56.7      Immature Granulocytes %, Automated 0.3      Lymphocytes % 32.1      Monocytes % 9.6      Eosinophils % 0.9      Basophils % 0.4      Neutrophils Absolute 3.92      Immature Granulocytes Absolute, Automated 0.02      Lymphocytes Absolute 2.22      Monocytes Absolute 0.66      Eosinophils Absolute 0.06      Basophils Absolute 0.03          CT angio chest for pulmonary embolism   Final Result   No evidence of pulmonary embolism or acute thoracic aortic pathology.   A few small subcentimeter right upper lobe pulmonary nodules.   Mild bibasilar scar versus atelectasis. No pulmonary consolidation.   Signed by Faisal Williamson MD      XR chest 1 view   Final Result   Normal heart size with no radiographic signs of active pulmonary   parenchymal infiltration.  Findings similar to the prior chest x-ray.    The multiple small pulmonary nodules seen on CT are not visualized   radiographically nor were they previously.   Signed by Yamilet Almanza DO           ECG 12 Lead    Performed by:  Mylene Choudhury PA-C  Authorized by: Mylene Choudhury PA-C    Comments:      EKG shows normal sinus rhythm with a rate of 84 bpm.  No ST elevation.  MN interval is 156 ms and QRS duration is 74 ms.  EKG interpretation per myself, Mylene Choudhury       Medical Decision Making  Patient is a 53-year-old male with a history of hyperlipidemia, GERD, hyperglycemia, and obesity who presents to the emergency room with a chief complaint left subscapular pain radiating to his anterior chest.  He states that he has had scapular pain/shoulder pain for approximately 3 days and today the pain radiated to his anterior chest.  He denies any shortness of breath.  No fever or chills.  No cough.  He denies any cardiac history.  Cardiac workup is unremarkable.  His pain is worse with movement and I do feel that this is likely muscular skeletal given presentation and negative workup.  Patient is requesting a muscle relaxer which I do feel is reasonable.  Patient instructed to follow-up with his PCP and was also referred to cardiology.  He is instructed return for any new or worsening symptoms.  Differential diagnosis includes but not limited to left shoulder sprain, rotator cuff injury,angina, unstable angina, PE    Amount and/or Complexity of Data Reviewed  Labs: ordered. Decision-making details documented in ED Course.  Radiology: ordered. Decision-making details documented in ED Course.         Diagnoses as of 04/12/24 2125   Pulmonary nodules   Chest pain, unspecified type                    Mylene Choudhury PA-C  04/12/24 2126

## 2024-04-15 ENCOUNTER — OFFICE VISIT (OUTPATIENT)
Dept: PRIMARY CARE | Facility: CLINIC | Age: 54
End: 2024-04-15
Payer: COMMERCIAL

## 2024-04-15 VITALS
SYSTOLIC BLOOD PRESSURE: 132 MMHG | HEIGHT: 67 IN | DIASTOLIC BLOOD PRESSURE: 86 MMHG | HEART RATE: 84 BPM | WEIGHT: 190 LBS | OXYGEN SATURATION: 98 % | BODY MASS INDEX: 29.82 KG/M2

## 2024-04-15 DIAGNOSIS — M25.512 CHRONIC LEFT SHOULDER PAIN: Primary | ICD-10-CM

## 2024-04-15 DIAGNOSIS — G89.29 CHRONIC LEFT SHOULDER PAIN: Primary | ICD-10-CM

## 2024-04-15 DIAGNOSIS — M54.2 NECK PAIN: ICD-10-CM

## 2024-04-15 LAB
ATRIAL RATE: 84 BPM
P AXIS: 40 DEGREES
P OFFSET: 185 MS
P ONSET: 138 MS
PR INTERVAL: 156 MS
Q ONSET: 216 MS
QRS COUNT: 14 BEATS
QRS DURATION: 74 MS
QT INTERVAL: 364 MS
QTC CALCULATION(BAZETT): 430 MS
QTC FREDERICIA: 407 MS
R AXIS: -8 DEGREES
T AXIS: 38 DEGREES
T OFFSET: 398 MS
VENTRICULAR RATE: 84 BPM

## 2024-04-15 PROCEDURE — 1036F TOBACCO NON-USER: CPT | Performed by: INTERNAL MEDICINE

## 2024-04-15 PROCEDURE — 99213 OFFICE O/P EST LOW 20 MIN: CPT | Performed by: INTERNAL MEDICINE

## 2024-04-15 ASSESSMENT — ENCOUNTER SYMPTOMS
ARTHRALGIAS: 0
NECK PAIN: 1
FATIGUE: 0
SHORTNESS OF BREATH: 0
CHEST TIGHTNESS: 0
BACK PAIN: 0
PALPITATIONS: 0
COUGH: 0
WHEEZING: 0
VOMITING: 0
DIARRHEA: 0
NAUSEA: 0

## 2024-04-15 NOTE — PROGRESS NOTES
"Subjective   Patient ID: Luke Goldberg is a 53 y.o. male who presents for Follow-up.  HPI  He is here today for follow-up after a recent visit to the emergency department.  He started developing some left-sided shoulder pain with radiation down his arm and then it went to the center of his chest.  He went to the emergency department on April 12.  There he had multiple labs including a CT angiogram.  Nothing of a serious nature was identified.  He was sent home with instructions to follow-up here but he has also been referred to cardiology.  I told him to keep the cardiology appointment for sure but based on what we discussed today I am feeling that his symptoms may be \"musculoskeletal\".  We talked about the neck and the shoulder.  We talked about the possibility of a radiculopathy.  He states that when he lays a certain way on the bed he does get some numbness and tingling down his left arm.  Also when he turns his neck a certain way he feels similar symptoms.  I have asked that he go get x-rays of his neck and left shoulder and once the results are known I will contact him.  I also told him that I would like for him to partake in physical therapy wait until after he is cleared by cardiology.  He states he would like to get back into weightlifting to get healthier in preparation for [facility].  I told him that that is good but I want to make sure he sees a physical therapist first before he does any type of heavy lifting and we talked about making sure he understands proper technique etc.  Review of Systems   Constitutional:  Negative for fatigue.   Respiratory:  Negative for cough, chest tightness, shortness of breath and wheezing.    Cardiovascular:  Positive for chest pain. Negative for palpitations and leg swelling.   Gastrointestinal:  Negative for diarrhea, nausea and vomiting.   Musculoskeletal:  Positive for neck pain. Negative for arthralgias and back pain.     Objective   Physical Exam  Vitals and nursing " note reviewed.   Constitutional:       General: He is not in acute distress.     Appearance: Normal appearance.   HENT:      Head: Normocephalic and atraumatic.   Eyes:      Conjunctiva/sclera: Conjunctivae normal.   Cardiovascular:      Rate and Rhythm: Normal rate and regular rhythm.      Heart sounds: Normal heart sounds.   Pulmonary:      Effort: No respiratory distress.      Breath sounds: No wheezing.   Abdominal:      Palpations: Abdomen is soft.      Tenderness: There is no abdominal tenderness. There is no guarding.   Musculoskeletal:         General: No swelling. Normal range of motion.   Skin:     General: Skin is warm and dry.   Neurological:      General: No focal deficit present.      Mental Status: He is alert and oriented to person, place, and time.   Psychiatric:         Behavior: Behavior normal.       Recent Results (from the past 168 hour(s))   CBC and Auto Differential    Collection Time: 04/12/24  6:12 PM   Result Value Ref Range    WBC 6.9 4.4 - 11.3 x10*3/uL    nRBC 0.0 0.0 - 0.0 /100 WBCs    RBC 5.16 4.50 - 5.90 x10*6/uL    Hemoglobin 15.7 13.5 - 17.5 g/dL    Hematocrit 47.7 41.0 - 52.0 %    MCV 92 80 - 100 fL    MCH 30.4 26.0 - 34.0 pg    MCHC 32.9 32.0 - 36.0 g/dL    RDW 12.5 11.5 - 14.5 %    Platelets 219 150 - 450 x10*3/uL    Neutrophils % 56.7 40.0 - 80.0 %    Immature Granulocytes %, Automated 0.3 0.0 - 0.9 %    Lymphocytes % 32.1 13.0 - 44.0 %    Monocytes % 9.6 2.0 - 10.0 %    Eosinophils % 0.9 0.0 - 6.0 %    Basophils % 0.4 0.0 - 2.0 %    Neutrophils Absolute 3.92 1.20 - 7.70 x10*3/uL    Immature Granulocytes Absolute, Automated 0.02 0.00 - 0.70 x10*3/uL    Lymphocytes Absolute 2.22 1.20 - 4.80 x10*3/uL    Monocytes Absolute 0.66 0.10 - 1.00 x10*3/uL    Eosinophils Absolute 0.06 0.00 - 0.70 x10*3/uL    Basophils Absolute 0.03 0.00 - 0.10 x10*3/uL   Comprehensive Metabolic Panel    Collection Time: 04/12/24  6:12 PM   Result Value Ref Range    Glucose 86 74 - 99 mg/dL    Sodium 137  136 - 145 mmol/L    Potassium 4.3 3.5 - 5.3 mmol/L    Chloride 103 98 - 107 mmol/L    Bicarbonate 29 21 - 32 mmol/L    Anion Gap 9 (L) 10 - 20 mmol/L    Urea Nitrogen 12 6 - 23 mg/dL    Creatinine 0.85 0.50 - 1.30 mg/dL    eGFR >90 >60 mL/min/1.73m*2    Calcium 8.7 8.6 - 10.3 mg/dL    Albumin 4.0 3.4 - 5.0 g/dL    Alkaline Phosphatase 43 33 - 120 U/L    Total Protein 6.6 6.4 - 8.2 g/dL    AST 15 9 - 39 U/L    Bilirubin, Total 0.5 0.0 - 1.2 mg/dL    ALT 21 10 - 52 U/L   Magnesium    Collection Time: 04/12/24  6:12 PM   Result Value Ref Range    Magnesium 2.17 1.60 - 2.40 mg/dL   aPTT    Collection Time: 04/12/24  6:12 PM   Result Value Ref Range    aPTT 32 27 - 38 seconds   Protime-INR    Collection Time: 04/12/24  6:12 PM   Result Value Ref Range    Protime 11.2 9.8 - 12.8 seconds    INR 1.0 0.9 - 1.1   D-Dimer, VTE Exclusion    Collection Time: 04/12/24  6:12 PM   Result Value Ref Range    D-Dimer, Quantitative VTE Exclusion <215 <=500 ng/mL FEU   Troponin I, High Sensitivity, Initial    Collection Time: 04/12/24  6:12 PM   Result Value Ref Range    Troponin I, High Sensitivity <3 0 - 20 ng/L   Sars-CoV-2 and Influenza A/B PCR    Collection Time: 04/12/24  8:21 PM   Result Value Ref Range    Flu A Result Not Detected Not Detected    Flu B Result Not Detected Not Detected    Coronavirus 2019, PCR Not Detected Not Detected   Troponin, High Sensitivity, 1 Hour    Collection Time: 04/12/24  8:29 PM   Result Value Ref Range    Troponin I, High Sensitivity <3 0 - 20 ng/L   ECG 12 Lead    Collection Time: 04/12/24  9:26 PM   Result Value Ref Range    Ventricular Rate 84 BPM    Atrial Rate 84 BPM    VA Interval 156 ms    QRS Duration 74 ms    QT Interval 364 ms    QTC Calculation(Bazett) 430 ms    P Axis 40 degrees    R Axis -8 degrees    T Axis 38 degrees    QRS Count 14 beats    Q Onset 216 ms    P Onset 138 ms    P Offset 185 ms    T Offset 398 ms    QTC Fredericia 407 ms       Assessment/Plan   Problem List Items  Addressed This Visit             ICD-10-CM    Neck pain M54.2     -He will go for an x-ray at his earliest convenience and have agreed to contact him with results  -I believe it is possible he may have a radiculopathy on the left side of his neck going down his arm-once he is cleared by cardiology, I will suggest that he partake in physical therapy           Chronic left shoulder pain - Primary M25.512, G89.29     -He will get an x-ray of his shoulder but I do believe he may have radiculopathy coming from his cervical spine               Alisha Lau, DO

## 2024-04-15 NOTE — ASSESSMENT & PLAN NOTE
-He will get an x-ray of his shoulder but I do believe he may have radiculopathy coming from his cervical spine

## 2024-04-15 NOTE — ASSESSMENT & PLAN NOTE
-He will go for an x-ray at his earliest convenience and have agreed to contact him with results  -I believe it is possible he may have a radiculopathy on the left side of his neck going down his arm-once he is cleared by cardiology, I will suggest that he partake in physical therapy

## 2024-04-15 NOTE — PATIENT INSTRUCTIONS
As we discussed I am glad that you have an appointment with cardiology and please keep that visit.  That way we can get their opinion on your symptoms and make sure they do not feel that this is a cardiac problem.  My sense is that this is more musculoskeletal and originating from your neck and involving your left arm.  You can go get the x-rays are ordered of your neck and shoulder and once the results are known I will contact you  Once you are cleared by cardiology then I would recommend you partake in physical therapy

## 2024-04-18 ENCOUNTER — OFFICE VISIT (OUTPATIENT)
Dept: CARDIOLOGY | Facility: CLINIC | Age: 54
End: 2024-04-18
Payer: COMMERCIAL

## 2024-04-18 VITALS
DIASTOLIC BLOOD PRESSURE: 70 MMHG | BODY MASS INDEX: 29.98 KG/M2 | SYSTOLIC BLOOD PRESSURE: 128 MMHG | OXYGEN SATURATION: 97 % | WEIGHT: 191 LBS | HEART RATE: 87 BPM | HEIGHT: 67 IN

## 2024-04-18 DIAGNOSIS — I70.90 ATHEROSCLEROSIS: Primary | ICD-10-CM

## 2024-04-18 PROCEDURE — 1036F TOBACCO NON-USER: CPT | Performed by: STUDENT IN AN ORGANIZED HEALTH CARE EDUCATION/TRAINING PROGRAM

## 2024-04-18 PROCEDURE — 99204 OFFICE O/P NEW MOD 45 MIN: CPT | Performed by: STUDENT IN AN ORGANIZED HEALTH CARE EDUCATION/TRAINING PROGRAM

## 2024-04-18 NOTE — PROGRESS NOTES
Chief Complaint   Patient presents with    New Patient Visit     Pt here to establish for chest pains.  Pt states has sharp pains left shoulder/back that goes to chest.     HPI:  I was requested by Dr. Lau to evaluate this patient in consultation for cardiac assessment.    Mr. Luke Goldberg is a 53 y.o. year old non-smoker male with past medical history significant for hypertension, hyperlipidemia, GERD, obesity, neck pain, coming for assessment of atypical chest pain. Patient reports 2 episodes of chest pain in the past 3 months. He states that the pain starts in his L shoulder and back, and radiates to his chest, not associated with exertion, is continuous, lasts for 3 days and is self limited. Notably, his father  of complications after coronary procedure at age 81yo. He denies shortness of breath, palpitations, leg edema, lightheadedness, headaches, fever, chills, orthopnea, paroxysmal nocturnal dyspnea or syncope.   EKG shows normal sinus rhythm with no signs of ACS.       Past Medical History  Past Medical History:   Diagnosis Date    Erectile dysfunction 2024    GERD (gastroesophageal reflux disease)     Hyperlipidemia        Past Surgical History  Past Surgical History:   Procedure Laterality Date    BICEPS TENDON REPAIR      OTHER SURGICAL HISTORY  2018    Wrist Surgery       Past Family History  Family History   Problem Relation Name Age of Onset    Hypertension Mother      Heart disease Father         Allergy History  Allergies   Allergen Reactions    Sulfamethoxazole-Trimethoprim Unknown and Rash    Banana Extract Dizziness    Hydrocodone-Acetaminophen Itching    Penicillins Angioedema    Metronidazole Rash     cancor sores       Past Social History  Social History     Socioeconomic History    Marital status:      Spouse name: None    Number of children: None    Years of education: None    Highest education level: None   Occupational History    None   Tobacco Use    Smoking  "status: Never    Smokeless tobacco: Never   Vaping Use    Vaping status: Never Used   Substance and Sexual Activity    Alcohol use: Yes    Drug use: Not Currently    Sexual activity: None   Other Topics Concern    None   Social History Narrative    None     Social Determinants of Health     Financial Resource Strain: Not on file   Food Insecurity: Not on file   Transportation Needs: Not on file   Physical Activity: Not on file   Stress: Not on file   Social Connections: Not on file   Intimate Partner Violence: Not on file   Housing Stability: Not on file       Social History     Tobacco Use   Smoking Status Never   Smokeless Tobacco Never       Review of Systems:  Constitutional: Denies any fever or chills  Eyes: Denies any eye pain or blurry vision  ENT: Denies any ear pain or hearing loss  Cardiovascular: The heart rate is not slow, the heart rate is not fast  Respiratory: Denies any asthma/wheezing  Gastrointestinal: Denies any dori colored stools or fatty food intolerance  Genitourinary: Denies any blood in the urine or pelvic pain  Musculoskeletal: Denies any swelling in the joints or difficulty walking  Skin: Denies any skin lumps or skin lesions  Neurological: Denies any dizziness/tingling      Objective Data:  Last Recorded Vitals:  Vitals:    04/18/24 1528   BP: 128/70   BP Location: Right arm   Patient Position: Sitting   Pulse: 87   SpO2: 97%   Weight: 86.6 kg (191 lb)   Height: 1.702 m (5' 7\")       Last Labs:  CBC - 4/12/2024:  6:12 PM  6.9 15.7 219    47.7      CMP - 4/12/2024:  6:12 PM  8.7 6.6 15 --- 0.5   _ 4.0 21 43      PTT - 4/12/2024:  6:12 PM  1.0   11.2 32     TROPHS   Date/Time Value Ref Range Status   04/12/2024 08:29 PM <3 0 - 20 ng/L Final   04/12/2024 06:12 PM <3 0 - 20 ng/L Final   06/17/2023 10:44 PM <3 0 - 20 ng/L Final     Comment:     .  Less than 99th percentile of normal range cutoff-  Female and children under 18 years old <14 ng/L; Male <21 ng/L: Negative  Repeat testing should " be performed if clinically indicated.   .  Female and children under 18 years old 14-50 ng/L; Male 21-50 ng/L:  Consistent with possible cardiac damage and possible increased clinical   risk. Serial measurements may help to assess extent of myocardial damage.   .  >50 ng/L: Consistent with cardiac damage, increased clinical risk and  myocardial infarction. Serial measurements may help assess extent of   myocardial damage.   .   NOTE: Children less than 1 year old may have higher baseline troponin   levels and results should be interpreted in conjunction with the overall   clinical context.   .  NOTE: Troponin I testing is performed using a different   testing methodology at [facility] than at other   [facility] hospitals. Direct result comparisons should only   be made within the same method.     06/17/2023 09:50 PM <3 0 - 20 ng/L Final     Comment:     .  Less than 99th percentile of normal range cutoff-  Female and children under 18 years old <14 ng/L; Male <21 ng/L: Negative  Repeat testing should be performed if clinically indicated.   .  Female and children under 18 years old 14-50 ng/L; Male 21-50 ng/L:  Consistent with possible cardiac damage and possible increased clinical   risk. Serial measurements may help to assess extent of myocardial damage.   .  >50 ng/L: Consistent with cardiac damage, increased clinical risk and  myocardial infarction. Serial measurements may help assess extent of   myocardial damage.   .   NOTE: Children less than 1 year old may have higher baseline troponin   levels and results should be interpreted in conjunction with the overall   clinical context.   .  NOTE: Troponin I testing is performed using a different   testing methodology at [facility] than at other   [facility]. Direct result comparisons should only   be made within the same method.       HGBA1C   Date/Time Value Ref Range Status   05/24/2022 01:00 PM 5.6 % Final     Comment:           Diagnosis of Diabetes-Adults   Non-Diabetic: < or = 5.6%   Increased risk for developing diabetes: 5.7-6.4%   Diagnostic of diabetes: > or = 6.5%  .       Monitoring of Diabetes                Age (y)     Therapeutic Goal (%)   Adults:          >18           <7.0   Pediatrics:    13-18           <7.5                   7-12           <8.0                   0- 6            7.5-8.5   American Diabetes Association. Diabetes Care 33(S1), Jan 2010.       VLDL   Date/Time Value Ref Range Status   07/24/2023 09:29 AM 50 0 - 40 mg/dL Final   12/13/2022 12:45 PM 55 0 - 40 mg/dL Final   05/24/2022 01:00 PM 51 0 - 40 mg/dL Final        Patient Medications:  Outpatient Encounter Medications as of 4/18/2024   Medication Sig Dispense Refill    atorvastatin (Lipitor) 20 mg tablet TAKE 1 TABLET BY MOUTH EVERY DAY 90 tablet 1    multivitamin tablet Take 1 tablet by mouth once daily.      omeprazole (PriLOSEC) 40 mg DR capsule Take 1 capsule (40 mg) by mouth once daily in the morning. Take before meals. Do not crush or chew. 30 capsule 5    cyclobenzaprine (Flexeril) 10 mg tablet Take 1 tablet (10 mg) by mouth 3 times a day as needed for muscle spasms for up to 3 days. 9 tablet 0    [DISCONTINUED] alfuzosin (Uroxatral) 10 mg 24 hr tablet Take 1 tablet (10 mg) by mouth once daily at bedtime.       No facility-administered encounter medications on file as of 4/18/2024.       Physical Exam:  General: alert, oriented and in no acute distress  HEENT: NC/AT; EOMI; PERRLA, external ear is normal  Neck: supple; trachea midline; no masses; no JVD  Chest: clear breath sounds bilaterally; no wheezing  Cardio: regular rhythm, S1S2 normal, no murmurs  Abdomen: Soft, non-tender, non-distension, no organomegaly  Extremities: no clubbing/cyanosis/edema  Neuro: Grossly intact     Psychiatric: Normal mood and affect     Past Cardiology Results (Last 3 Years):  EKG:  ECG 12 Lead 04/12/2024    Echo:  Echo Results:  No results found for this or any  previous visit from the past 365 days.     Cath:  No results found for this or any previous visit from the past 1095 days.    CV NCDR CATHPCI V5 COLLECTION FORM   Stress Test:  Nuclear Stress Test 2023    Cardiac Imaging:  No results found for this or any previous visit from the past 1095 days.       Assessment/Plan   Mr. Luke Goldberg is a 53 y.o. year old non-smoker male with past medical history significant for hypertension, hyperlipidemia, GERD, obesity, neck pain, coming for assessment of atypical chest pain. Patient reports 2 episodes of chest pain in the past 3 months. He states that the pain starts in his L shoulder and back, and radiates to his chest, not associated with exertion, is continuous, lasts for 3 days and is self limited. Notably, his father  of complications after coronary procedure at age 81yo. He denies shortness of breath, palpitations, leg edema, lightheadedness, headaches, fever, chills, orthopnea, paroxysmal nocturnal dyspnea or syncope.     # Chest pain  - Pain seems to be atypical.  - EKG shows normal sinus rhythm with no signs of ACS.   - Will request stress test, echocardiogram, CT calcium scoring, 48h holter monitor  - Follow up after tests.    # Hyperlipidemia  - Controlled by PCP.  - Keep home medication with Rosuvastatin 20mg daily.  - Counseled on healthy diet and regular exercise.      We have discussed the most common side effects of the prescribed medications, indications, drug interactions, risks, complications, and alternatives of medications/therapeutics were explained and discussed. The patient has been requested to monitor closely for any untoward side effects or complications of medications. The patient has been strongly advised to be compliant with the recommendations, all the questions and concerns have been addressed. The patient has been also instructed to call, to return sooner or to go to the emergency department if symptoms persist or get worsen. The  patient voiced understanding and denies any further questions at this time.     This note was transcribed using the Dragon Dictation system. There may be grammatical, punctuation, or verbiage errors that occur with voice recognition programs.    Counseling greater than 50% of visit regarding all cardiac issues.    Thank you, Dr. Lau, for allowing me to participate in the care of this patient. Please do not hesitate to contact me with any further questions or concerns.    Rodrigo Doe MD  Cardiology

## 2024-04-30 ENCOUNTER — HOSPITAL ENCOUNTER (OUTPATIENT)
Dept: CARDIOLOGY | Facility: HOSPITAL | Age: 54
Discharge: HOME | End: 2024-04-30
Payer: COMMERCIAL

## 2024-04-30 VITALS — SYSTOLIC BLOOD PRESSURE: 138 MMHG | HEART RATE: 93 BPM | DIASTOLIC BLOOD PRESSURE: 92 MMHG

## 2024-04-30 DIAGNOSIS — I70.0 ATHEROSCLEROSIS OF AORTA (CMS-HCC): ICD-10-CM

## 2024-04-30 DIAGNOSIS — I70.90 ATHEROSCLEROSIS: ICD-10-CM

## 2024-04-30 PROCEDURE — 93018 CV STRESS TEST I&R ONLY: CPT | Performed by: STUDENT IN AN ORGANIZED HEALTH CARE EDUCATION/TRAINING PROGRAM

## 2024-04-30 PROCEDURE — 93016 CV STRESS TEST SUPVJ ONLY: CPT | Performed by: STUDENT IN AN ORGANIZED HEALTH CARE EDUCATION/TRAINING PROGRAM

## 2024-04-30 PROCEDURE — 93017 CV STRESS TEST TRACING ONLY: CPT

## 2024-05-15 ENCOUNTER — HOSPITAL ENCOUNTER (OUTPATIENT)
Dept: CARDIOLOGY | Facility: HOSPITAL | Age: 54
Discharge: HOME | End: 2024-05-15
Payer: COMMERCIAL

## 2024-05-15 ENCOUNTER — APPOINTMENT (OUTPATIENT)
Dept: CARDIOLOGY | Facility: HOSPITAL | Age: 54
End: 2024-05-15
Payer: COMMERCIAL

## 2024-05-15 ENCOUNTER — TELEPHONE (OUTPATIENT)
Dept: CARDIOLOGY | Facility: CLINIC | Age: 54
End: 2024-05-15
Payer: COMMERCIAL

## 2024-05-15 ENCOUNTER — HOSPITAL ENCOUNTER (OUTPATIENT)
Dept: RADIOLOGY | Facility: HOSPITAL | Age: 54
Discharge: HOME | End: 2024-05-15
Payer: COMMERCIAL

## 2024-05-15 DIAGNOSIS — I70.90 ATHEROSCLEROSIS: ICD-10-CM

## 2024-05-15 DIAGNOSIS — I25.10 ATHEROSCLEROTIC HEART DISEASE OF NATIVE CORONARY ARTERY WITHOUT ANGINA PECTORIS: ICD-10-CM

## 2024-05-15 DIAGNOSIS — R07.9 CHEST PAIN, UNSPECIFIED: ICD-10-CM

## 2024-05-15 LAB
AORTIC VALVE MEAN GRADIENT: 3 MMHG
AORTIC VALVE PEAK VELOCITY: 1.08 M/S
AV PEAK GRADIENT: 4.7 MMHG
AVA (PEAK VEL): 2.14 CM2
AVA (VTI): 2.04 CM2
EJECTION FRACTION APICAL 4 CHAMBER: 54.4
LEFT ATRIUM VOLUME AREA LENGTH INDEX BSA: 8.4 ML/M2
LEFT VENTRICLE INTERNAL DIMENSION DIASTOLE: 4.68 CM (ref 3.5–6)
LEFT VENTRICULAR OUTFLOW TRACT DIAMETER: 1.8 CM
LV EJECTION FRACTION BIPLANE: 52 %
MITRAL VALVE E/A RATIO: 1
RIGHT VENTRICLE FREE WALL PEAK S': 11.4 CM/S
TRICUSPID ANNULAR PLANE SYSTOLIC EXCURSION: 2.3 CM

## 2024-05-15 PROCEDURE — 93306 TTE W/DOPPLER COMPLETE: CPT

## 2024-05-15 PROCEDURE — 75571 CT HRT W/O DYE W/CA TEST: CPT

## 2024-05-15 PROCEDURE — 9420000001 HC RT PATIENT EDUCATION 5 MIN

## 2024-05-15 PROCEDURE — 93225 XTRNL ECG REC<48 HRS REC: CPT

## 2024-05-15 PROCEDURE — 93227 XTRNL ECG REC<48 HR R&I: CPT | Performed by: STUDENT IN AN ORGANIZED HEALTH CARE EDUCATION/TRAINING PROGRAM

## 2024-05-15 PROCEDURE — 93306 TTE W/DOPPLER COMPLETE: CPT | Performed by: STUDENT IN AN ORGANIZED HEALTH CARE EDUCATION/TRAINING PROGRAM

## 2024-05-15 NOTE — TELEPHONE ENCOUNTER
----- Message from Yeimi Reid CMA sent at 5/15/2024  2:48 PM EDT -----  Pt has been notified.   ----- Message -----  From: Rodrigo Doe MD  Sent: 5/15/2024   1:42 PM EDT  To: Do Meier2f [facility] Clinical Support Staff    Please let the patient know that I have reviewed this test and the result was normal. The patient should keep current medication and normal activities at this point.   ----- Message -----  From: Interface, Radiology Results In  Sent: 5/15/2024  12:56 PM EDT  To: Rodrigo Doe MD

## 2024-05-16 ENCOUNTER — TELEPHONE (OUTPATIENT)
Dept: CARDIOLOGY | Facility: CLINIC | Age: 54
End: 2024-05-16
Payer: COMMERCIAL

## 2024-05-16 NOTE — TELEPHONE ENCOUNTER
----- Message from Yeimi Reid CMA sent at 5/16/2024  8:53 AM EDT -----  Pt has been notified.   ----- Message -----  From: Rodrigo Doe MD  Sent: 5/15/2024   5:14 PM EDT  To: Do Meier2f [facility] Clinical Support Staff    Please let the patient know that I have reviewed this test and the result was normal. The patient should keep current medication and normal activities at this point.   ----- Message -----  From: Yash, Syngo - Cardiology Results In  Sent: 5/15/2024   3:41 PM EDT  To: Rodrgio Doe MD

## 2024-05-20 ENCOUNTER — TELEPHONE (OUTPATIENT)
Dept: CARDIOLOGY | Facility: CLINIC | Age: 54
End: 2024-05-20
Payer: COMMERCIAL

## 2024-05-20 NOTE — TELEPHONE ENCOUNTER
Linnette from [employer] calling. Pt had 18 pauses, longest was 4.8 seconds on 48 hr holter. States report is on the website to view.

## 2024-05-30 ENCOUNTER — OFFICE VISIT (OUTPATIENT)
Dept: CARDIOLOGY | Facility: CLINIC | Age: 54
End: 2024-05-30
Payer: COMMERCIAL

## 2024-05-30 VITALS
OXYGEN SATURATION: 97 % | DIASTOLIC BLOOD PRESSURE: 92 MMHG | WEIGHT: 192 LBS | BODY MASS INDEX: 30.13 KG/M2 | HEART RATE: 105 BPM | HEIGHT: 67 IN | SYSTOLIC BLOOD PRESSURE: 140 MMHG

## 2024-05-30 DIAGNOSIS — I45.5 SINUS PAUSE: Primary | ICD-10-CM

## 2024-05-30 PROCEDURE — 99214 OFFICE O/P EST MOD 30 MIN: CPT | Performed by: STUDENT IN AN ORGANIZED HEALTH CARE EDUCATION/TRAINING PROGRAM

## 2024-05-30 PROCEDURE — 1036F TOBACCO NON-USER: CPT | Performed by: STUDENT IN AN ORGANIZED HEALTH CARE EDUCATION/TRAINING PROGRAM

## 2024-05-30 NOTE — PROGRESS NOTES
Chief Complaint   Patient presents with    Follow-up     Testing     HPI:  I was requested by Dr. Lau to evaluate this patient in consultation for cardiac assessment.     Mr. Luke Goldberg is a 53 y.o. year old non-smoker male with past medical history significant for hypertension, hyperlipidemia, GERD, obesity, neck pain, coming for assessment of atypical chest pain. Patient reports 2 episodes of chest pain in the past 3 months. He states that the pain starts in his L shoulder and back, and radiates to his chest, not associated with exertion, is continuous, lasts for 3 days and is self limited. Notably, his father  of complications after coronary procedure at age 81yo. He denies shortness of breath, palpitations, leg edema, lightheadedness, headaches, fever, chills, orthopnea, paroxysmal nocturnal dyspnea or syncope.   EKG shows normal sinus rhythm with no signs of ACS.       Past Medical History  Past Medical History:   Diagnosis Date    Erectile dysfunction 2024    GERD (gastroesophageal reflux disease)     Hyperlipidemia        Past Surgical History  Past Surgical History:   Procedure Laterality Date    BICEPS TENDON REPAIR      OTHER SURGICAL HISTORY  2018    Wrist Surgery       Past Family History  Family History   Problem Relation Name Age of Onset    Hypertension Mother      Heart disease Father         Allergy History  Allergies   Allergen Reactions    Sulfamethoxazole-Trimethoprim Unknown and Rash    Banana Extract Dizziness    Hydrocodone-Acetaminophen Itching    Penicillins Angioedema    Metronidazole Rash     cancor sores       Past Social History  Social History     Socioeconomic History    Marital status:      Spouse name: None    Number of children: None    Years of education: None    Highest education level: None   Occupational History    None   Tobacco Use    Smoking status: Never    Smokeless tobacco: Never   Vaping Use    Vaping status: Never Used   Substance and Sexual  "Activity    Alcohol use: Yes     Comment: Moderate    Drug use: Not Currently    Sexual activity: None   Other Topics Concern    None   Social History Narrative    None     Social Determinants of Health     Financial Resource Strain: Not on file   Food Insecurity: Not on file   Transportation Needs: Not on file   Physical Activity: Not on file   Stress: Not on file   Social Connections: Not on file   Intimate Partner Violence: Not on file   Housing Stability: Not on file       Social History     Tobacco Use   Smoking Status Never   Smokeless Tobacco Never       Review of Systems:  Constitutional: Denies any fever or chills  Eyes: Denies any eye pain or blurry vision  ENT: Denies any ear pain or hearing loss  Cardiovascular: The heart rate is not slow, the heart rate is not fast  Respiratory: Denies any asthma/wheezing  Gastrointestinal: Denies any dori colored stools or fatty food intolerance  Genitourinary: Denies any blood in the urine or pelvic pain  Musculoskeletal: Denies any swelling in the joints or difficulty walking  Skin: Denies any skin lumps or skin lesions  Neurological: Denies any dizziness/tingling      Objective Data:  Last Recorded Vitals:  Vitals:    05/30/24 1550   BP: (!) 140/92   Pulse: 105   SpO2: 97%   Weight: 87.1 kg (192 lb)   Height: 1.702 m (5' 7\")       Last Labs:  CBC - 4/12/2024:  6:12 PM  6.9 15.7 219    47.7      CMP - 4/12/2024:  6:12 PM  8.7 6.6 15 --- 0.5   _ 4.0 21 43      PTT - 4/12/2024:  6:12 PM  1.0   11.2 32     TROPHS   Date/Time Value Ref Range Status   04/12/2024 08:29 PM <3 0 - 20 ng/L Final   04/12/2024 06:12 PM <3 0 - 20 ng/L Final   06/17/2023 10:44 PM <3 0 - 20 ng/L Final     Comment:     .  Less than 99th percentile of normal range cutoff-  Female and children under 18 years old <14 ng/L; Male <21 ng/L: Negative  Repeat testing should be performed if clinically indicated.   .  Female and children under 18 years old 14-50 ng/L; Male 21-50 ng/L:  Consistent with " possible cardiac damage and possible increased clinical   risk. Serial measurements may help to assess extent of myocardial damage.   .  >50 ng/L: Consistent with cardiac damage, increased clinical risk and  myocardial infarction. Serial measurements may help assess extent of   myocardial damage.   .   NOTE: Children less than 1 year old may have higher baseline troponin   levels and results should be interpreted in conjunction with the overall   clinical context.   .  NOTE: Troponin I testing is performed using a different   testing methodology at [facility] than at other   [facility]. Direct result comparisons should only   be made within the same method.     06/17/2023 09:50 PM <3 0 - 20 ng/L Final     Comment:     .  Less than 99th percentile of normal range cutoff-  Female and children under 18 years old <14 ng/L; Male <21 ng/L: Negative  Repeat testing should be performed if clinically indicated.   .  Female and children under 18 years old 14-50 ng/L; Male 21-50 ng/L:  Consistent with possible cardiac damage and possible increased clinical   risk. Serial measurements may help to assess extent of myocardial damage.   .  >50 ng/L: Consistent with cardiac damage, increased clinical risk and  myocardial infarction. Serial measurements may help assess extent of   myocardial damage.   .   NOTE: Children less than 1 year old may have higher baseline troponin   levels and results should be interpreted in conjunction with the overall   clinical context.   .  NOTE: Troponin I testing is performed using a different   testing methodology at [facility] than at other   [facility]. Direct result comparisons should only   be made within the same method.       HGBA1C   Date/Time Value Ref Range Status   05/24/2022 01:00 PM 5.6 % Final     Comment:          Diagnosis of Diabetes-Adults   Non-Diabetic: < or = 5.6%   Increased risk for developing diabetes: 5.7-6.4%   Diagnostic of diabetes:  > or = 6.5%  .       Monitoring of Diabetes                Age (y)     Therapeutic Goal (%)   Adults:          >18           <7.0   Pediatrics:    13-18           <7.5                   7-12           <8.0                   0- 6            7.5-8.5   American Diabetes Association. Diabetes Care 33(S1), Jan 2010.       VLDL   Date/Time Value Ref Range Status   07/24/2023 09:29 AM 50 0 - 40 mg/dL Final   12/13/2022 12:45 PM 55 0 - 40 mg/dL Final   05/24/2022 01:00 PM 51 0 - 40 mg/dL Final        Patient Medications:  Outpatient Encounter Medications as of 5/30/2024   Medication Sig Dispense Refill    atorvastatin (Lipitor) 20 mg tablet TAKE 1 TABLET BY MOUTH EVERY DAY 90 tablet 1    multivitamin tablet Take 1 tablet by mouth once daily.      omeprazole (PriLOSEC) 40 mg DR capsule Take 1 capsule (40 mg) by mouth once daily in the morning. Take before meals. Do not crush or chew. 30 capsule 5    cyclobenzaprine (Flexeril) 10 mg tablet Take 1 tablet (10 mg) by mouth 3 times a day as needed for muscle spasms for up to 3 days. (Patient not taking: Reported on 5/30/2024) 9 tablet 0     No facility-administered encounter medications on file as of 5/30/2024.       Physical Exam:  General: alert, oriented and in no acute distress  HEENT: NC/AT; EOMI; PERRLA, external ear is normal  Neck: supple; trachea midline; no masses; no JVD  Chest: clear breath sounds bilaterally; no wheezing  Cardio: regular rhythm, S1S2 normal, no murmurs  Abdomen: Soft, non-tender, non-distension, no organomegaly  Extremities: no clubbing/cyanosis/edema  Neuro: Grossly intact     Psychiatric: Normal mood and affect     Past Cardiology Results (Last 3 Years):  EKG:  ECG 12 Lead 04/12/2024    Echo:  Echo Results:  Transthoracic Echo (TTE) Complete 05/15/2024    [place] [address]  Phone [phone], Fax [phone]    TRANSTHORACIC ECHOCARDIOGRAM REPORT      Patient Name:      ISABEL  NABOR         Reading Physician:    [id number] Andrey Reyes MD  Study Date:        5/15/2024            Ordering Provider:    [id number] GIBRAN RINCON  MRN/PID:           [id number]             Fellow:  Accession#:        [id number]         Nurse:  Date of Birth/Age: 1970 / 53 years Sonographer:          Jatinder Zheng RDCS  Gender:            M                    Additional Staff:  Height:            170.18 cm            Admit Date:  Weight:            86.64 kg             Admission Status:     Outpatient  BSA / BMI:         1.98 m2 / 29.92      Department Location:  [facility]  kg/m2  Blood Pressure: 132 /85 mmHg    Study Type:    TRANSTHORACIC ECHO (TTE) COMPLETE  Diagnosis/ICD: Atherosclerotic heart disease of native coronary artery without  angina pectoris-I25.10; Chest pain, unspecified-R07.9  CPT Codes:     Echo Complete w Full Doppler-18440  Study Detail: The following Echo studies were performed: 2D, M-Mode, Doppler and  color flow.      PHYSICIAN INTERPRETATION:  Left Ventricle: Left ventricular systolic function is normal, with an estimated ejection fraction of 55%. There are no regional wall motion abnormalities. The left ventricular cavity size is normal. Spectral Doppler shows a normal pattern of left ventricular diastolic filling.  Left Atrium: The left atrium is normal in size.  Right Ventricle: The right ventricle is normal in size. There is normal right ventricular global systolic function.  Right Atrium: The right atrium is normal in size.  Aortic Valve: The aortic valve is trileaflet. There is no evidence of aortic valve regurgitation. The peak instantaneous gradient of the aortic valve is 4.7 mmHg. The mean gradient of the aortic valve is 3.0 mmHg.  Mitral Valve: The mitral valve is normal in structure. There is no evidence of mitral valve regurgitation.  Tricuspid Valve: The tricuspid valve is structurally normal. No evidence of tricuspid regurgitation.  Pulmonic Valve: The pulmonic  valve is structurally normal. There is no indication of pulmonic valve regurgitation.  Pericardium: There is no pericardial effusion noted.  Aorta: The aortic root is normal.  Systemic Veins: The inferior vena cava appears to be of normal size.      CONCLUSIONS:  1. Left ventricular systolic function is normal with a 55% estimated ejection fraction.    QUANTITATIVE DATA SUMMARY:  2D MEASUREMENTS:  Normal Ranges:  Ao Root d:     3.00 cm   (2.0-3.7cm)  LAs:           3.30 cm   (2.7-4.0cm)  IVSd:          0.95 cm   (0.6-1.1cm)  LVPWd:         1.00 cm   (0.6-1.1cm)  LVIDd:         4.68 cm   (3.9-5.9cm)  LVIDs:         2.94 cm  LV Mass Index: 79.7 g/m2  LV % FS        37.2 %    LA VOLUME:  Normal Ranges:  LA Vol A4C:        15.0 ml   (22+/-6mL/m2)  LA Vol A2C:        18.2 ml  LA Vol BP:         16.6 ml  LA Vol Index A4C:  7.6ml/m2  LA Vol Index A2C:  9.2 ml/m2  LA Vol Index BP:   8.4 ml/m2  LA Area A4C:       8.9 cm2  LA Area A2C:       9.9 cm2  LA Major Axis A4C: 4.5 cm  LA Major Axis A2C: 4.6 cm  LA Volume Index:   7.3 ml/m2  LA Vol A4C:        14.4 ml  LA Vol A2C:        17.5 ml    LV SYSTOLIC FUNCTION BY 2D PLANIMETRY (MOD):  Normal Ranges:  EF-A4C View: 54.4 % (>=55%)  EF-A2C View: 48.5 %  EF-Biplane:  51.7 %    LV DIASTOLIC FUNCTION:  Normal Ranges:  MV Peak E:    0.60 m/s (0.7-1.2 m/s)  MV Peak A:    0.60 m/s (0.42-0.7 m/s)  E/A Ratio:    1.00     (1.0-2.2)  MV lateral e' 0.10 m/s  MV medial e'  0.09 m/s    MITRAL VALVE:  Normal Ranges:  MV DT: 165 msec (150-240msec)    AORTIC VALVE:  Normal Ranges:  AoV Vmax:                1.08 m/s (<=1.7m/s)  AoV Peak P.7 mmHg (<20mmHg)  AoV Mean PG:             3.0 mmHg (1.7-11.5mmHg)  LVOT Max Nitesh:            0.91 m/s (<=1.1m/s)  AoV VTI:                 22.50 cm (18-25cm)  LVOT VTI:                18.00 cm  LVOT Diameter:           1.80 cm  (1.8-2.4cm)  AoV Area, VTI:           2.04 cm2 (2.5-5.5cm2)  AoV Area,Vmax:           2.14 cm2 (2.5-4.5cm2)  AoV  Dimensionless Index: 0.80      RIGHT VENTRICLE:  RV Basal 3.69 cm  RV Mid   2.43 cm  RV Major 7.6 cm  TAPSE:   22.6 mm  RV s'    0.11 m/s      [id number] Andrey Reyes MD  Electronically signed on 5/15/2024 at 3:41:51 PM        ** Final **     Cath:  No results found for this or any previous visit from the past 1095 days.    CV NCDR CATHPCI V5 COLLECTION FORM   Stress Test:  Stress Test 2024      Nuclear Stress Test 2023    Cardiac Imaging:  No results found for this or any previous visit from the past 1095 days.       Assessment/Plan   Mr. Luke Goldberg is a 53 y.o. year old non-smoker male with past medical history significant for hypertension, hyperlipidemia, GERD, obesity, neck pain, coming for assessment of atypical chest pain. Patient reports 2 episodes of chest pain in the past 3 months. He states that the pain starts in his L shoulder and back, and radiates to his chest, not associated with exertion, is continuous, lasts for 3 days and is self limited. Notably, his father  of complications after coronary procedure at age 81yo. He denies shortness of breath, palpitations, leg edema, lightheadedness, headaches, fever, chills, orthopnea, paroxysmal nocturnal dyspnea or syncope.      # Chest pain  - Pain seems to be atypical.  - EKG shows normal sinus rhythm with no signs of ACS.   - Stress test was negative for ischemia  - The echocardiogram showed normal LVEF 55% with no wall motion abnormalities.   - CT calcium scoring is zero  - 48h holter monitor:  *48h holter monitor  *The predominant rhythm was Sinus with Pauses.  *The Maximum Heart Rate recorded was 123 bpm,  23:16:37, the Minimum Heart Rate recorded was 23  bpm,  05:24:43, and the Average Heart Rate was 75 bpm.  *The study included 18 Pauses. The Longest Pause was 4.8s,  08:10:47.  *There were 7 SVE beats with a burden of <1 %.  *There was 1 Patient Trigger.  *No signs of atrial fibrillation, atrial flutter, sinus pauses or  malignant arrhythmias.  - Patient will be scheduled with EP team - Dr. Gaines for pacemaker evaluation.  - Follow up in 6 months.     # Hyperlipidemia  - Controlled by PCP.  - Keep home medication with Rosuvastatin 20mg daily.  - Counseled on healthy diet and regular exercise.      We have discussed the most common side effects of the prescribed medications, indications, drug interactions, risks, complications, and alternatives of medications/therapeutics were explained and discussed. The patient has been requested to monitor closely for any untoward side effects or complications of medications. The patient has been strongly advised to be compliant with the recommendations, all the questions and concerns have been addressed. The patient has been also instructed to call, to return sooner or to go to the emergency department if symptoms persist or get worsen. The patient voiced understanding and denies any further questions at this time.      This note was transcribed using the Dragon Dictation system. There may be grammatical, punctuation, or verbiage errors that occur with voice recognition programs.     Counseling greater than 50% of visit regarding all cardiac issues.     Thank you, Dr. Lau, for allowing me to participate in the care of this patient. Please do not hesitate to contact me with any further questions or concerns.     Rodrigo Doe MD  Cardiology

## 2024-06-06 ENCOUNTER — LAB (OUTPATIENT)
Dept: LAB | Facility: LAB | Age: 54
End: 2024-06-06
Payer: COMMERCIAL

## 2024-06-06 ENCOUNTER — OFFICE VISIT (OUTPATIENT)
Dept: PRIMARY CARE | Facility: CLINIC | Age: 54
End: 2024-06-06
Payer: COMMERCIAL

## 2024-06-06 VITALS
OXYGEN SATURATION: 94 % | BODY MASS INDEX: 29.66 KG/M2 | SYSTOLIC BLOOD PRESSURE: 128 MMHG | HEART RATE: 82 BPM | DIASTOLIC BLOOD PRESSURE: 84 MMHG | HEIGHT: 67 IN | WEIGHT: 189 LBS

## 2024-06-06 DIAGNOSIS — M62.830 SPASM OF THORACIC BACK MUSCLE: ICD-10-CM

## 2024-06-06 DIAGNOSIS — R73.9 HYPERGLYCEMIA: ICD-10-CM

## 2024-06-06 DIAGNOSIS — E78.5 HYPERLIPIDEMIA LDL GOAL <100: ICD-10-CM

## 2024-06-06 DIAGNOSIS — R10.11 RIGHT UPPER QUADRANT ABDOMINAL PAIN: Primary | ICD-10-CM

## 2024-06-06 DIAGNOSIS — R10.11 RIGHT UPPER QUADRANT ABDOMINAL PAIN: ICD-10-CM

## 2024-06-06 LAB
ALBUMIN SERPL BCP-MCNC: 4.1 G/DL (ref 3.4–5)
ALP SERPL-CCNC: 47 U/L (ref 33–120)
ALT SERPL W P-5'-P-CCNC: 26 U/L (ref 10–52)
ANION GAP SERPL CALC-SCNC: 10 MMOL/L (ref 10–20)
AST SERPL W P-5'-P-CCNC: 19 U/L (ref 9–39)
BASOPHILS # BLD MANUAL: 0.06 X10*3/UL (ref 0–0.1)
BASOPHILS NFR BLD MANUAL: 1 %
BILIRUB SERPL-MCNC: 0.8 MG/DL (ref 0–1.2)
BUN SERPL-MCNC: 12 MG/DL (ref 6–23)
CALCIUM SERPL-MCNC: 8.7 MG/DL (ref 8.6–10.3)
CHLORIDE SERPL-SCNC: 106 MMOL/L (ref 98–107)
CHOLEST SERPL-MCNC: 173 MG/DL (ref 0–199)
CHOLESTEROL/HDL RATIO: 4
CO2 SERPL-SCNC: 26 MMOL/L (ref 21–32)
CREAT SERPL-MCNC: 0.99 MG/DL (ref 0.5–1.3)
EGFRCR SERPLBLD CKD-EPI 2021: 90 ML/MIN/1.73M*2
EOSINOPHIL # BLD MANUAL: 0 X10*3/UL (ref 0–0.7)
EOSINOPHIL NFR BLD MANUAL: 0 %
ERYTHROCYTE [DISTWIDTH] IN BLOOD BY AUTOMATED COUNT: 12.7 % (ref 11.5–14.5)
EST. AVERAGE GLUCOSE BLD GHB EST-MCNC: 105 MG/DL
GLUCOSE SERPL-MCNC: 86 MG/DL (ref 74–99)
HBA1C MFR BLD: 5.3 %
HCT VFR BLD AUTO: 48 % (ref 41–52)
HDLC SERPL-MCNC: 43 MG/DL
HGB BLD-MCNC: 15.9 G/DL (ref 13.5–17.5)
IMM GRANULOCYTES # BLD AUTO: 0.03 X10*3/UL (ref 0–0.7)
IMM GRANULOCYTES NFR BLD AUTO: 0.5 % (ref 0–0.9)
LDLC SERPL CALC-MCNC: 106 MG/DL
LIPASE SERPL-CCNC: 26 U/L (ref 9–82)
LYMPHOCYTES # BLD MANUAL: 0.54 X10*3/UL (ref 1.2–4.8)
LYMPHOCYTES NFR BLD MANUAL: 9 %
MCH RBC QN AUTO: 30.5 PG (ref 26–34)
MCHC RBC AUTO-ENTMCNC: 33.1 G/DL (ref 32–36)
MCV RBC AUTO: 92 FL (ref 80–100)
MONOCYTES # BLD MANUAL: 1.32 X10*3/UL (ref 0.1–1)
MONOCYTES NFR BLD MANUAL: 22 %
NEUTROPHILS # BLD MANUAL: 3.48 X10*3/UL (ref 1.2–7.7)
NEUTS BAND # BLD MANUAL: 0.12 X10*3/UL (ref 0–0.7)
NEUTS BAND NFR BLD MANUAL: 2 %
NEUTS SEG # BLD MANUAL: 3.36 X10*3/UL (ref 1.2–7)
NEUTS SEG NFR BLD MANUAL: 56 %
NON HDL CHOLESTEROL: 130 MG/DL (ref 0–149)
NRBC BLD-RTO: 0 /100 WBCS (ref 0–0)
PLATELET # BLD AUTO: 228 X10*3/UL (ref 150–450)
PLATELET CLUMP BLD QL SMEAR: PRESENT
POTASSIUM SERPL-SCNC: 4.5 MMOL/L (ref 3.5–5.3)
PROT SERPL-MCNC: 6.9 G/DL (ref 6.4–8.2)
RBC # BLD AUTO: 5.21 X10*6/UL (ref 4.5–5.9)
RBC MORPH BLD: ABNORMAL
SODIUM SERPL-SCNC: 137 MMOL/L (ref 136–145)
TOTAL CELLS COUNTED BLD: 100
TRIGL SERPL-MCNC: 122 MG/DL (ref 0–149)
VARIANT LYMPHS # BLD MANUAL: 0.6 X10*3/UL (ref 0–0.5)
VARIANT LYMPHS NFR BLD: 10 %
VLDL: 24 MG/DL (ref 0–40)
WBC # BLD AUTO: 6 X10*3/UL (ref 4.4–11.3)

## 2024-06-06 PROCEDURE — 83036 HEMOGLOBIN GLYCOSYLATED A1C: CPT

## 2024-06-06 PROCEDURE — 83690 ASSAY OF LIPASE: CPT

## 2024-06-06 PROCEDURE — 80053 COMPREHEN METABOLIC PANEL: CPT

## 2024-06-06 PROCEDURE — 85027 COMPLETE CBC AUTOMATED: CPT

## 2024-06-06 PROCEDURE — 80061 LIPID PANEL: CPT

## 2024-06-06 PROCEDURE — 1036F TOBACCO NON-USER: CPT | Performed by: INTERNAL MEDICINE

## 2024-06-06 PROCEDURE — 99213 OFFICE O/P EST LOW 20 MIN: CPT | Performed by: INTERNAL MEDICINE

## 2024-06-06 PROCEDURE — 36415 COLL VENOUS BLD VENIPUNCTURE: CPT

## 2024-06-06 PROCEDURE — 85007 BL SMEAR W/DIFF WBC COUNT: CPT

## 2024-06-06 RX ORDER — CYCLOBENZAPRINE HCL 10 MG
10 TABLET ORAL 3 TIMES DAILY PRN
Qty: 10 TABLET | Refills: 0 | Status: SHIPPED | OUTPATIENT
Start: 2024-06-06 | End: 2024-06-11 | Stop reason: ALTCHOICE

## 2024-06-06 ASSESSMENT — ENCOUNTER SYMPTOMS
CONSTIPATION: 0
SHORTNESS OF BREATH: 0
COUGH: 0
ABDOMINAL PAIN: 1
FEVER: 0
DIARRHEA: 0
BACK PAIN: 0
PALPITATIONS: 0
VOMITING: 0
NAUSEA: 0
BLOOD IN STOOL: 0
ARTHRALGIAS: 0
WHEEZING: 0
FATIGUE: 0

## 2024-06-06 NOTE — PROGRESS NOTES
Subjective   Patient ID: Luke Goldberg is a 53 y.o. male who presents for Pain.  He is here today for evaluation of some rather atypical abdominal pain that has been occurring over the past 4 to 5 days.  He points to the area of the liver and also the area of the spleen.  He states he has felt a generalized discomfort and sometimes his appetite is decreased.  He states that it particularly bothersome at night and he feels best if he is lying on his stomach.  We did conduct a full review of systems and on today's examination he does have some tenderness to palpation of the regions described.  There is no evidence of rebound or guarding.  He denies any new medications and has had no unusual activities.  We did briefly discuss his consultation with cardiology recently and he is being referred to EP for evaluation of a possible pacemaker.  On today's examination his blood pressure is excellent and his pulse rate is normal.  I have decided to order some laboratory testing and to order an ultrasound of his right upper quadrant as an initial evaluation.  I told him that if his symptoms became severe he would need to go to the emergency room.  I also want him to keep his diet bland and we will try to get the studies done as quickly as possible and see him in follow-up.  Review of Systems   Constitutional:  Negative for fatigue and fever.   Respiratory:  Negative for cough, shortness of breath and wheezing.         OCC CORONA WITH INCREASED WEIGHT   Cardiovascular:  Negative for chest pain, palpitations and leg swelling.   Gastrointestinal:  Positive for abdominal pain. Negative for blood in stool, constipation, diarrhea, nausea and vomiting.   Musculoskeletal:  Negative for arthralgias and back pain.     Objective   Physical Exam  Vitals and nursing note reviewed.   Constitutional:       General: He is not in acute distress.     Appearance: Normal appearance.   HENT:      Head: Normocephalic and atraumatic.   Eyes:       Conjunctiva/sclera: Conjunctivae normal.   Cardiovascular:      Rate and Rhythm: Normal rate and regular rhythm.      Heart sounds: Normal heart sounds.   Pulmonary:      Effort: No respiratory distress.      Breath sounds: No wheezing.   Abdominal:      Palpations: Abdomen is soft.      Tenderness: There is no abdominal tenderness. There is no guarding.   Musculoskeletal:         General: No swelling. Normal range of motion.   Skin:     General: Skin is warm and dry.   Neurological:      General: No focal deficit present.      Mental Status: He is alert and oriented to person, place, and time.   Psychiatric:         Behavior: Behavior normal.       Assessment/Plan   Problem List Items Addressed This Visit             ICD-10-CM    Right upper quadrant abdominal pain - Primary R10.11     -He is actually having discomfort in both upper portions of his abdomen and he does have slight tenderness to palpation  -I will have him get laboratory testing done before leaving today which include evaluation of his liver, blood count, kidneys, and pancreas  -I will ask the staff to order an ultrasound of his right upper quadrant to assess liver and gallbladder as soon as possible  -I will see him back in follow-up relatively soon and he has been instructed to go to the emergency room if his symptoms suddenly worsen.         Relevant Orders    CBC and Auto Differential    Comprehensive Metabolic Panel    US abdomen limited liver    Lipase     Other Visit Diagnoses         Codes    Spasm of thoracic back muscle     M62.830    Relevant Medications    cyclobenzaprine (Flexeril) 10 mg tablet               Alisha Lau DO

## 2024-06-06 NOTE — ASSESSMENT & PLAN NOTE
-He is actually having discomfort in both upper portions of his abdomen and he does have slight tenderness to palpation  -I will have him get laboratory testing done before leaving today which include evaluation of his liver, blood count, kidneys, and pancreas  -I will ask the staff to order an ultrasound of his right upper quadrant to assess liver and gallbladder as soon as possible  -I will see him back in follow-up relatively soon and he has been instructed to go to the emergency room if his symptoms suddenly worsen.

## 2024-06-06 NOTE — PATIENT INSTRUCTIONS
As we discussed I am not exactly certain what is causing your abdominal discomfort.  I will have you get lab work drawn today before leaving that help us look at your liver, pancreas, and your blood count and kidneys  As soon as these results are known I will give you a call  The staff will also be helping you to get an ultrasound scheduled for your right upper quadrant where your liver and gallbladder since  Please remember to eat a bland diet and if you suddenly become worse we would asked that you go to the emergency room  I will try to see you back as quickly as possible for follow-up

## 2024-06-10 ENCOUNTER — HOSPITAL ENCOUNTER (OUTPATIENT)
Dept: RADIOLOGY | Facility: HOSPITAL | Age: 54
Discharge: HOME | End: 2024-06-10
Payer: COMMERCIAL

## 2024-06-10 DIAGNOSIS — R10.11 RIGHT UPPER QUADRANT ABDOMINAL PAIN: ICD-10-CM

## 2024-06-10 PROCEDURE — 76705 ECHO EXAM OF ABDOMEN: CPT | Performed by: RADIOLOGY

## 2024-06-10 PROCEDURE — 76705 ECHO EXAM OF ABDOMEN: CPT

## 2024-06-11 ENCOUNTER — OFFICE VISIT (OUTPATIENT)
Dept: PRIMARY CARE | Facility: CLINIC | Age: 54
End: 2024-06-11
Payer: COMMERCIAL

## 2024-06-11 ENCOUNTER — TELEPHONE (OUTPATIENT)
Dept: CARDIOLOGY | Facility: CLINIC | Age: 54
End: 2024-06-11

## 2024-06-11 ENCOUNTER — OFFICE VISIT (OUTPATIENT)
Dept: CARDIOLOGY | Facility: CLINIC | Age: 54
End: 2024-06-11
Payer: COMMERCIAL

## 2024-06-11 VITALS
HEIGHT: 67 IN | WEIGHT: 191 LBS | BODY MASS INDEX: 29.98 KG/M2 | OXYGEN SATURATION: 98 % | SYSTOLIC BLOOD PRESSURE: 120 MMHG | DIASTOLIC BLOOD PRESSURE: 80 MMHG | HEART RATE: 79 BPM

## 2024-06-11 VITALS
HEART RATE: 76 BPM | HEIGHT: 67 IN | OXYGEN SATURATION: 98 % | BODY MASS INDEX: 29.82 KG/M2 | SYSTOLIC BLOOD PRESSURE: 128 MMHG | WEIGHT: 190 LBS | DIASTOLIC BLOOD PRESSURE: 82 MMHG

## 2024-06-11 DIAGNOSIS — R73.9 HYPERGLYCEMIA: ICD-10-CM

## 2024-06-11 DIAGNOSIS — E78.5 HYPERLIPIDEMIA LDL GOAL <100: ICD-10-CM

## 2024-06-11 DIAGNOSIS — R10.11 RIGHT UPPER QUADRANT ABDOMINAL PAIN: ICD-10-CM

## 2024-06-11 DIAGNOSIS — R07.89 COSTOCHONDRAL PAIN: Primary | ICD-10-CM

## 2024-06-11 DIAGNOSIS — Z12.5 SCREENING FOR PROSTATE CANCER: ICD-10-CM

## 2024-06-11 DIAGNOSIS — I45.5 SINUS PAUSE: Primary | ICD-10-CM

## 2024-06-11 DIAGNOSIS — K76.0 FATTY LIVER: ICD-10-CM

## 2024-06-11 PROBLEM — R03.0 ELEVATED BLOOD PRESSURE READING: Status: RESOLVED | Noted: 2024-01-22 | Resolved: 2024-06-11

## 2024-06-11 PROBLEM — R25.2 LEG CRAMPS: Status: RESOLVED | Noted: 2024-01-04 | Resolved: 2024-06-11

## 2024-06-11 PROCEDURE — 93000 ELECTROCARDIOGRAM COMPLETE: CPT | Performed by: STUDENT IN AN ORGANIZED HEALTH CARE EDUCATION/TRAINING PROGRAM

## 2024-06-11 PROCEDURE — 1036F TOBACCO NON-USER: CPT | Performed by: STUDENT IN AN ORGANIZED HEALTH CARE EDUCATION/TRAINING PROGRAM

## 2024-06-11 PROCEDURE — 99214 OFFICE O/P EST MOD 30 MIN: CPT | Performed by: INTERNAL MEDICINE

## 2024-06-11 PROCEDURE — 99214 OFFICE O/P EST MOD 30 MIN: CPT | Performed by: STUDENT IN AN ORGANIZED HEALTH CARE EDUCATION/TRAINING PROGRAM

## 2024-06-11 PROCEDURE — 1036F TOBACCO NON-USER: CPT | Performed by: INTERNAL MEDICINE

## 2024-06-11 ASSESSMENT — ENCOUNTER SYMPTOMS
CHEST TIGHTNESS: 0
DIARRHEA: 0
BACK PAIN: 0
COUGH: 0
SHORTNESS OF BREATH: 0
VOMITING: 0
BLOOD IN STOOL: 0
ABDOMINAL PAIN: 0
PALPITATIONS: 0
NAUSEA: 0
FEVER: 0
FATIGUE: 0
WHEEZING: 0

## 2024-06-11 NOTE — PROGRESS NOTES
Subjective   Patient ID: Luke Goldberg is a 53 y.o. male who presents for Follow-up (1 week FUV).  HPI  He is here today for follow-up and we reviewed his test results.  On his lab workup very pleased.  His blood glucose was normal as well as his hemoglobin A1c of 5.3.  His liver and kidney profile was normal and his lipase value was normal.  His cholesterol was almost perfect but his LDL was slightly raised at 102.  We talked about his ultrasound which shows a normal gallbladder and his liver has what appears to be some mild fatty infiltration.  We talked about the importance of weight loss.  He states that the symptoms that he presented with are actually improving and he feels like they are resolving.  Again he was having pain and he points to the lower bilateral rib cage anteriorly.  It appears to be in some ways musculoskeletal.  We are reminded that he had a recent evaluation for his heart and everything appeared to be okay.  We also discussed the influences of stress and even anxiety.  He states that he has noticed that he has been here quite frequently and he thinks that he needs to make some lifestyle changes and increase his exercise and lose weight.  We talked about how stress and anxiety can sometimes produce a lot of symptoms.  Again, he will contact me in a couple weeks to let me know how he is doing with his discomfort.  I believe that it is inflammation of his rib cage and he knows that if his symptoms do not resolve or suddenly gets worse he needs to get a hold of me.  We will summarize everything in a problem based format and we talked about making sure he turns in his FOBT kit this week.  Review of Systems   Constitutional:  Negative for fatigue and fever.   Respiratory:  Negative for cough, chest tightness, shortness of breath and wheezing.    Cardiovascular:  Negative for chest pain, palpitations and leg swelling.   Gastrointestinal:  Negative for abdominal pain, blood in stool, diarrhea, nausea  and vomiting.   Musculoskeletal:  Negative for back pain.     Objective   Physical Exam  Vitals and nursing note reviewed.   Constitutional:       General: He is not in acute distress.     Appearance: Normal appearance.   HENT:      Head: Normocephalic and atraumatic.   Eyes:      Conjunctiva/sclera: Conjunctivae normal.   Cardiovascular:      Rate and Rhythm: Normal rate and regular rhythm.      Heart sounds: Normal heart sounds.   Pulmonary:      Effort: No respiratory distress.      Breath sounds: No wheezing.   Abdominal:      Palpations: Abdomen is soft.      Tenderness: There is no abdominal tenderness. There is no guarding.   Musculoskeletal:         General: No swelling. Normal range of motion.   Skin:     General: Skin is warm and dry.   Neurological:      General: No focal deficit present.      Mental Status: He is alert and oriented to person, place, and time.   Psychiatric:         Behavior: Behavior normal.       Recent Results (from the past 336 hour(s))   Lipid panel    Collection Time: 06/06/24 12:30 PM   Result Value Ref Range    Cholesterol 173 0 - 199 mg/dL    HDL-Cholesterol 43.0 mg/dL    Cholesterol/HDL Ratio 4.0     LDL Calculated 106 (H) <=99 mg/dL    VLDL 24 0 - 40 mg/dL    Triglycerides 122 0 - 149 mg/dL    Non HDL Cholesterol 130 0 - 149 mg/dL   Hemoglobin A1C    Collection Time: 06/06/24 12:30 PM   Result Value Ref Range    Hemoglobin A1C 5.3 see below %    Estimated Average Glucose 105 Not Established mg/dL   CBC and Auto Differential    Collection Time: 06/06/24 12:30 PM   Result Value Ref Range    WBC 6.0 4.4 - 11.3 x10*3/uL    nRBC 0.0 0.0 - 0.0 /100 WBCs    RBC 5.21 4.50 - 5.90 x10*6/uL    Hemoglobin 15.9 13.5 - 17.5 g/dL    Hematocrit 48.0 41.0 - 52.0 %    MCV 92 80 - 100 fL    MCH 30.5 26.0 - 34.0 pg    MCHC 33.1 32.0 - 36.0 g/dL    RDW 12.7 11.5 - 14.5 %    Platelets 228 150 - 450 x10*3/uL    Immature Granulocytes %, Automated 0.5 0.0 - 0.9 %    Immature Granulocytes Absolute,  Automated 0.03 0.00 - 0.70 x10*3/uL   Comprehensive Metabolic Panel    Collection Time: 06/06/24 12:30 PM   Result Value Ref Range    Glucose 86 74 - 99 mg/dL    Sodium 137 136 - 145 mmol/L    Potassium 4.5 3.5 - 5.3 mmol/L    Chloride 106 98 - 107 mmol/L    Bicarbonate 26 21 - 32 mmol/L    Anion Gap 10 10 - 20 mmol/L    Urea Nitrogen 12 6 - 23 mg/dL    Creatinine 0.99 0.50 - 1.30 mg/dL    eGFR 90 >60 mL/min/1.73m*2    Calcium 8.7 8.6 - 10.3 mg/dL    Albumin 4.1 3.4 - 5.0 g/dL    Alkaline Phosphatase 47 33 - 120 U/L    Total Protein 6.9 6.4 - 8.2 g/dL    AST 19 9 - 39 U/L    Bilirubin, Total 0.8 0.0 - 1.2 mg/dL    ALT 26 10 - 52 U/L   Lipase    Collection Time: 06/06/24 12:30 PM   Result Value Ref Range    Lipase 26 9 - 82 U/L   Manual Differential    Collection Time: 06/06/24 12:30 PM   Result Value Ref Range    Neutrophils %, Manual 56.0 40.0 - 80.0 %    Bands %, Manual 2.0 0.0 - 5.0 %    Lymphocytes %, Manual 9.0 13.0 - 44.0 %    Monocytes %, Manual 22.0 2.0 - 10.0 %    Eosinophils %, Manual 0.0 0.0 - 6.0 %    Basophils %, Manual 1.0 0.0 - 2.0 %    Atypical Lymphocytes %, Manual 10.0 0.0 - 2.0 %    Seg Neutrophils Absolute, Manual 3.36 1.20 - 7.00 x10*3/uL    Bands Absolute, Manual 0.12 0.00 - 0.70 x10*3/uL    Lymphocytes Absolute, Manual 0.54 (L) 1.20 - 4.80 x10*3/uL    Monocytes Absolute, Manual 1.32 (H) 0.10 - 1.00 x10*3/uL    Eosinophils Absolute, Manual 0.00 0.00 - 0.70 x10*3/uL    Basophils Absolute, Manual 0.06 0.00 - 0.10 x10*3/uL    Atypical Lymphs Absolute, Manual 0.60 (H) 0.00 - 0.50 x10*3/uL    Total Cells Counted 100     Neutrophils Absolute, Manual 3.48 1.20 - 7.70 x10*3/uL    RBC Morphology See Below     Clumped Platelets Present        Assessment/Plan   Problem List Items Addressed This Visit             ICD-10-CM    Hyperlipidemia LDL goal <100 E78.5     -Overall I am pleased with his cholesterol profile and he will continue taking his cholesterol-lowering medication         Hyperglycemia R73.9      -With his recent lab work his fasting glucose was okay and his hemoglobin A1c was normal at 5.3  -We will continue to monitor periodically and he knows that weight loss and exercise will dramatically reduce his risk of diabetes in the future         Relevant Orders    Basic Metabolic Panel    Right upper quadrant abdominal pain R10.11     -His ultrasound of the liver and gallbladder are essentially unremarkable with the exception of some mild fatty infiltration  -His symptoms appear to be more surface or musculoskeletal and he reports that his symptoms have improved since we saw him last time  -His laboratory tests look great  -He has had recent cardiac clearance  -He will contact me to let me know how he is doing in the next couple of weeks and he knows to call right away if his symptoms suddenly worsen         Costochondral pain - Primary R07.89     -I believe his symptoms that we evaluated this time are musculoskeletal in nature and since he is improving we will do nothing different         Fatty liver K76.0     -We discussed the importance of weight loss to improve overall liver health         Screening for prostate cancer Z12.5    Relevant Orders    Prostate Spec.Ag,Screen          Alisha Lau, DO

## 2024-06-11 NOTE — ASSESSMENT & PLAN NOTE
-His ultrasound of the liver and gallbladder are essentially unremarkable with the exception of some mild fatty infiltration  -His symptoms appear to be more surface or musculoskeletal and he reports that his symptoms have improved since we saw him last time  -His laboratory tests look great  -He has had recent cardiac clearance  -He will contact me to let me know how he is doing in the next couple of weeks and he knows to call right away if his symptoms suddenly worsen

## 2024-06-11 NOTE — ASSESSMENT & PLAN NOTE
-Overall I am pleased with his cholesterol profile and he will continue taking his cholesterol-lowering medication

## 2024-06-11 NOTE — ASSESSMENT & PLAN NOTE
-I believe his symptoms that we evaluated this time are musculoskeletal in nature and since he is improving we will do nothing different

## 2024-06-11 NOTE — PATIENT INSTRUCTIONS
As we discussed I am pleased with your laboratory test results and I am also pleased that you feel like your symptoms are resolving.  Please give me an update in a couple of weeks to let me know how you are doing and please call right away if you feel like your symptoms are worsening as opposed to getting better  As you are well aware we do see evidence of a condition called fatty liver and the best way to improve your liver health is to eat a healthy diet, exercise regularly, and try to get as close to ideal body weight as possible.  We also recommend that you stay away from any excessive alcohol use  Please remember to complete your FOBT kit and drop it off this week.  You will simply drop it off at the  and we will notify you about the results  If everything goes according to plan we will see you back in 6 months for follow-up.  We have ordered several labs to be done for that visit which includes a PSA for prostate cancer screening

## 2024-06-11 NOTE — ASSESSMENT & PLAN NOTE
-With his recent lab work his fasting glucose was okay and his hemoglobin A1c was normal at 5.3  -We will continue to monitor periodically and he knows that weight loss and exercise will dramatically reduce his risk of diabetes in the future

## 2024-06-11 NOTE — PROGRESS NOTES
"    Cardiac Electrophysiology Office Visit     Referred by Danny Baker MD for   Chief Complaint   Patient presents with    Discuss Holter Monitor    New Patient Visit     HPI:  Luke Goldberg is a 53 y.o. year old male patient with h/o HLD, HTN, GERD, obesity presenting today to establish care    Objective  Current Outpatient Medications   Medication Instructions    atorvastatin (LIPITOR) 20 mg, oral, Daily    multivitamin tablet 1 tablet, oral, Daily RT    omeprazole (PRILOSEC) 40 mg, oral, Daily before breakfast, Do not crush or chew.         Visit Vitals  /80   Pulse 79   Ht 1.702 m (5' 7\")   Wt 86.6 kg (191 lb)   SpO2 98%   BMI 29.91 kg/m²   Smoking Status Never   BSA 2.02 m²      Physical Exam  Vitals reviewed.   Constitutional:       Appearance: Normal appearance.   HENT:      Head: Normocephalic.   Cardiovascular:      Rate and Rhythm: Normal rate and regular rhythm.   Pulmonary:      Effort: Pulmonary effort is normal. No respiratory distress.      Breath sounds: No wheezing.   Skin:     General: Skin is warm and dry.      Capillary Refill: Capillary refill takes less than 2 seconds.   Neurological:      Mental Status: He is alert.   Psychiatric:         Mood and Affect: Mood normal.           My Interpretation of Reviewed Study(s):  Echo (May 2024): Normal left ventricular systolic function with an EF of 55%.  No regional wall motion abnormalities.  Normal biatrial size.  No significant valvular pathology.  No pericardial effusion.  48-hour Cardiac Monitor (May 2024): Predominant sinus rhythm with an average heart rate of 75 beats minute.  Few episodes of pauses noted most pauses noted during sleep hours between 3 AM and 8:30 AM.  Some pauses potentially during awake hours.  Longest pause 4.5 seconds.  Nuclear stress Test (April 2024): Stage III Joseph protocol patient exercised for 6 minutes 30 seconds achieving 7.7 METS.  Normal heart rate response and blood pressure response.  No ECG evidence " of ischemia  Coronary Artery Calcium Score (May 2024):    Assessment/Plan   #Sinus Pauses  Denies any dizziness, light headedness, syncope.   Likely bradycardia and is not pathological from a cardiac dysfunction standpoint.  Patient's workup including stress test and echo have been normal.  Since most of the episodes of sinus pauses and bradycardia were sleep they are likely related to undiagnosed sleep apnea.  Patient does report that his swelling is stated he does snore at night.  He also has very hectic sleep schedule and often will sleep between 2 to 3 AM to 8 AM.  Home sleep study to rule out undiagnosed sleep apnea  Counseled patient on preventative measures and overall cardiac care including exercise weight loss alcohol in moderation and treating any undiagnosed sleep apnea  No indication for PPM at this time    Return to Clinic: Patient should continue to follow with their Cardiologist, and can be referred back to me if any changes or new EP concerns arise.     Danny Gaines MD [facility]  Cardiac Electrophysiology  [email or web address]    **Disclaimer: This note was dictated by speech recognition, and every effort has been made to prevent any error in transcription, however minor errors may be present**

## 2024-06-11 NOTE — TELEPHONE ENCOUNTER
"Home sleep study ordered at visit today. Per sleep lab with his insurance, they do not cover the \"home\" portion of the study. It has to be changed to in lab sleep study as long as you are ok with that.  "

## 2024-06-12 DIAGNOSIS — I45.5 SINUS PAUSE: ICD-10-CM

## 2024-06-25 ENCOUNTER — APPOINTMENT (OUTPATIENT)
Dept: CARDIOLOGY | Facility: CLINIC | Age: 54
End: 2024-06-25
Payer: COMMERCIAL

## 2024-07-17 ENCOUNTER — CLINICAL SUPPORT (OUTPATIENT)
Dept: SLEEP MEDICINE | Facility: CLINIC | Age: 54
End: 2024-07-17
Payer: COMMERCIAL

## 2024-07-17 VITALS
DIASTOLIC BLOOD PRESSURE: 94 MMHG | BODY MASS INDEX: 29.97 KG/M2 | TEMPERATURE: 98.3 F | SYSTOLIC BLOOD PRESSURE: 147 MMHG | HEIGHT: 67 IN | WEIGHT: 190.92 LBS

## 2024-07-17 DIAGNOSIS — I45.5 SINUS PAUSE: ICD-10-CM

## 2024-07-17 ASSESSMENT — SLEEP AND FATIGUE QUESTIONNAIRES
HOW LIKELY ARE YOU TO NOD OFF OR FALL ASLEEP WHILE SITTING AND READING: WOULD NEVER DOZE
HOW LIKELY ARE YOU TO NOD OFF OR FALL ASLEEP WHEN YOU ARE A PASSENGER IN A CAR FOR AN HOUR WITHOUT A BREAK: SLIGHT CHANCE OF DOZING
SITING INACTIVE IN A PUBLIC PLACE LIKE A CLASS ROOM OR A MOVIE THEATER: WOULD NEVER DOZE
HOW LIKELY ARE YOU TO NOD OFF OR FALL ASLEEP WHILE WATCHING TV: SLIGHT CHANCE OF DOZING
HOW LIKELY ARE YOU TO NOD OFF OR FALL ASLEEP WHILE LYING DOWN TO REST IN THE AFTERNOON WHEN CIRCUMSTANCES PERMIT: WOULD NEVER DOZE
HOW LIKELY ARE YOU TO NOD OFF OR FALL ASLEEP WHILE SITTING AND TALKING TO SOMEONE: WOULD NEVER DOZE
HOW LIKELY ARE YOU TO NOD OFF OR FALL ASLEEP IN A CAR, WHILE STOPPED FOR A FEW MINUTES IN TRAFFIC: WOULD NEVER DOZE
ESS-CHAD TOTAL SCORE: 2
HOW LIKELY ARE YOU TO NOD OFF OR FALL ASLEEP WHILE SITTING QUIETLY AFTER LUNCH WITHOUT ALCOHOL: WOULD NEVER DOZE

## 2024-07-18 NOTE — PROGRESS NOTES
[facility] TECH NOTE:     Patient: Luke Goldberg   MRN//AGE: [id number]  1970  53 y.o.   Technologist: LORENZO NELSON   Room: 1   Service Date: 2024        Sleep Testing Location: [place] [address]     [place]: 2    TECHNOLOGIST SLEEP STUDY PROCEDURE NOTE:   This sleep study is being conducted according to the policies and procedures outlined by the AAS accreditation standards.  The sleep study procedure and processes involved during this appointment was explained to the patient/patient’s family, questions were answered. The patient/family verbalized understanding.      The patient is a 53 y.o. year old male scheduled for a Diagnostic PSG with montage of  PSG MASTER      The study that was ultimately completed was a Diagnostic PSG with montage of  PSG MASTER      The full study Was completed.  Patient questionnaires completed?: yes     Consents signed? yes    Initial Fall Risk Screening:     Luke has not fallen in the last 6 months. Luke does not have a fear of falling. He does not need assistance with sitting, standing, or walking. he does not need assistance walking in his home. he does not need assistance in an unfamiliar setting. The patient is notusing an assistive device.     Brief Study observations: Mr. Goldberg was scheduled for a PSG study. He stated he is here for having chest pain and pauses with his heart. He also stated he gets work calls during the night at any time. He did get a call during his study. He experienced hypopneas with arousals during his study along with loud continuous snoring.Ended the study with a High AHI.        After the procedure, the patient/family was informed to ensure followup with ordering clinician for testing results.      Technologist: LORENZO NELSON

## 2024-08-26 ENCOUNTER — APPOINTMENT (OUTPATIENT)
Age: 54
End: 2024-08-26
Payer: COMMERCIAL

## 2024-08-26 VITALS
BODY MASS INDEX: 30.13 KG/M2 | SYSTOLIC BLOOD PRESSURE: 136 MMHG | HEIGHT: 67 IN | HEART RATE: 80 BPM | WEIGHT: 192 LBS | DIASTOLIC BLOOD PRESSURE: 84 MMHG | OXYGEN SATURATION: 98 %

## 2024-08-26 DIAGNOSIS — G47.33 SEVERE OBSTRUCTIVE SLEEP APNEA: Primary | ICD-10-CM

## 2024-08-26 PROBLEM — R10.11 RIGHT UPPER QUADRANT ABDOMINAL PAIN: Status: RESOLVED | Noted: 2024-06-06 | Resolved: 2024-08-26

## 2024-08-26 PROBLEM — B37.2 YEAST DERMATITIS: Status: RESOLVED | Noted: 2024-01-22 | Resolved: 2024-08-26

## 2024-08-26 PROCEDURE — 99213 OFFICE O/P EST LOW 20 MIN: CPT | Performed by: INTERNAL MEDICINE

## 2024-08-26 PROCEDURE — 3008F BODY MASS INDEX DOCD: CPT | Performed by: INTERNAL MEDICINE

## 2024-08-26 PROCEDURE — 1036F TOBACCO NON-USER: CPT | Performed by: INTERNAL MEDICINE

## 2024-08-26 ASSESSMENT — PATIENT HEALTH QUESTIONNAIRE - PHQ9
1. LITTLE INTEREST OR PLEASURE IN DOING THINGS: NOT AT ALL
2. FEELING DOWN, DEPRESSED OR HOPELESS: NOT AT ALL
SUM OF ALL RESPONSES TO PHQ9 QUESTIONS 1 AND 2: 0

## 2024-08-26 NOTE — PROGRESS NOTES
Subjective   Patient ID: Luke Goldberg is a 53 y.o. male who presents for Follow-up (Go over sleep study results. ).  HPI  Today for follow up on his sleep study that was performed on August 17.  Much to my surprise it came back showing severe obstructive sleep apnea.  We discussed the implications of these findings and how it is important that we treat his condition.  We talked about CPAP therapy and I also gave him a handout that explains sleep apnea and its various treatments.  We talked about weight and he states he has been trying to work on getting his weight down.  We went over the BMI chart today.  We also discussed the importance of turning in his FOBT kit and he states he will be getting on that right away.  I told him we will need to see him back in about 3 to 4 weeks for follow-up after he has been on CPAP therapy and he will call with any problems getting that set up.  We went over the BMI chart today.  Objective   Physical Exam  Vitals and nursing note reviewed.   Constitutional:       General: He is not in acute distress.     Appearance: Normal appearance.   HENT:      Head: Normocephalic and atraumatic.   Eyes:      Conjunctiva/sclera: Conjunctivae normal.   Cardiovascular:      Rate and Rhythm: Normal rate and regular rhythm.      Heart sounds: Normal heart sounds.   Pulmonary:      Effort: No respiratory distress.      Breath sounds: No wheezing.   Abdominal:      Palpations: Abdomen is soft.      Tenderness: There is no abdominal tenderness. There is no guarding.   Musculoskeletal:         General: No swelling. Normal range of motion.   Skin:     General: Skin is warm and dry.   Neurological:      General: No focal deficit present.      Mental Status: He is alert and oriented to person, place, and time.   Psychiatric:         Behavior: Behavior normal.       Assessment/Plan   Problem List Items Addressed This Visit             ICD-10-CM    Severe obstructive sleep apnea - Primary G47.33     -Sleep  "study from August 17 confirms that he is suffering from \"severe \"obstructive sleep apnea and we discussed the importance of getting his condition treated.  -He will receive a prescription today for auto CPAP at 5 to 15 cm water pressure  -We talked about suppliers for which to go to  -Will see him back in 3 to 4 weeks  -I read the handout I gave him today on sleep apnea        PT INSTRUCTIONS  Today you will receive a handwritten prescription for CPAP therapy stapled to your CPAP report.  I suggest that you go to a CPAP supplier to get set up for CPAP therapy.  In [place] there is [facility] pharmacy and also [facility].  Sometimes it is wise to check with insurance company regarding who accept your plan.  If you go to the suppliers they will be able to tell you if your insurance accepts them as a supplier.  They also will help you get set up and explain everything to you.  Please read the handout I gave you today on sleep apnea and the importance of treating it  Please remember to turn in your FOBT kit ASA  We will see you back in approximately 3 to 4 weeks for follow-up       Alisha Lau, DO  "

## 2024-08-26 NOTE — ASSESSMENT & PLAN NOTE
"-Sleep study from August 17 confirms that he is suffering from \"severe \"obstructive sleep apnea and we discussed the importance of getting his condition treated.  -He will receive a prescription today for auto CPAP at 5 to 15 cm water pressure  -We talked about suppliers for which to go to  -Will see him back in 3 to 4 weeks  -I read the handout I gave him today on sleep apnea  "

## 2024-08-26 NOTE — PATIENT INSTRUCTIONS
Today you will receive a handwritten prescription for CPAP therapy stapled to your CPAP report.  I suggest that you go to a CPAP supplier to get set up for CPAP therapy.  In [place] there is [facility] and [facility].  Sometimes it is wise to check with insurance company regarding who accept your plan.  If you go to the suppliers they will be able to tell you if your insurance accepts them as a supplier.  They also will help you get set up and explain everything to you.  Please read the handout I gave you today on sleep apnea and the importance of treating it  Please remember to turn in your FOBT kit ASAP  We will see you back in approximately 3 to 4 weeks for follow-up

## 2024-08-27 ENCOUNTER — APPOINTMENT (OUTPATIENT)
Age: 54
End: 2024-08-27
Payer: COMMERCIAL

## 2024-09-23 ENCOUNTER — APPOINTMENT (OUTPATIENT)
Age: 54
End: 2024-09-23
Payer: COMMERCIAL

## 2024-11-26 ENCOUNTER — APPOINTMENT (OUTPATIENT)
Dept: CARDIOLOGY | Facility: CLINIC | Age: 54
End: 2024-11-26
Payer: COMMERCIAL

## 2024-11-26 VITALS
SYSTOLIC BLOOD PRESSURE: 138 MMHG | OXYGEN SATURATION: 96 % | HEART RATE: 95 BPM | BODY MASS INDEX: 32.24 KG/M2 | DIASTOLIC BLOOD PRESSURE: 84 MMHG | WEIGHT: 200.6 LBS | HEIGHT: 66 IN

## 2024-11-26 DIAGNOSIS — I45.5 SINUS PAUSE: ICD-10-CM

## 2024-11-26 DIAGNOSIS — I70.90 ATHEROSCLEROSIS: ICD-10-CM

## 2024-11-26 DIAGNOSIS — R07.9 CHEST PAIN, UNSPECIFIED TYPE: ICD-10-CM

## 2024-11-26 DIAGNOSIS — E78.5 HYPERLIPIDEMIA LDL GOAL <100: Primary | ICD-10-CM

## 2024-11-26 PROCEDURE — 1036F TOBACCO NON-USER: CPT | Performed by: STUDENT IN AN ORGANIZED HEALTH CARE EDUCATION/TRAINING PROGRAM

## 2024-11-26 PROCEDURE — 99214 OFFICE O/P EST MOD 30 MIN: CPT | Performed by: STUDENT IN AN ORGANIZED HEALTH CARE EDUCATION/TRAINING PROGRAM

## 2024-11-26 PROCEDURE — 3008F BODY MASS INDEX DOCD: CPT | Performed by: STUDENT IN AN ORGANIZED HEALTH CARE EDUCATION/TRAINING PROGRAM

## 2024-11-26 NOTE — PROGRESS NOTES
Chief Complaint   Patient presents with    Follow-up     6 month     HPI:  I was requested by Dr. Lau to evaluate this patient in consultation for cardiac assessment.     Mr. Luke Goldberg is a 54 y.o. year old non-smoker male with past medical history significant for hypertension, hyperlipidemia, GERD, obesity, neck pain, coming for assessment of atypical chest pain. Patient reports 2 episodes of chest pain in the past 3 months. He states that the pain starts in his L shoulder and back, and radiates to his chest, not associated with exertion, is continuous, lasts for 3 days and is self limited. Notably, his father  of complications after coronary procedure at age 81yo. He denies shortness of breath, palpitations, leg edema, lightheadedness, headaches, fever, chills, orthopnea, paroxysmal nocturnal dyspnea or syncope.   EKG shows normal sinus rhythm with no signs of ACS.       Past Medical History  Past Medical History:   Diagnosis Date    Erectile dysfunction 2024    GERD (gastroesophageal reflux disease)     Hyperlipidemia        Past Surgical History  Past Surgical History:   Procedure Laterality Date    BICEPS TENDON REPAIR      OTHER SURGICAL HISTORY  2018    Wrist Surgery       Past Family History  Family History   Problem Relation Name Age of Onset    Hypertension Mother      Heart disease Father         Allergy History  Allergies   Allergen Reactions    Sulfamethoxazole-Trimethoprim Unknown and Rash    Banana Extract Dizziness    Hydrocodone-Acetaminophen Itching    Penicillins Angioedema    Metronidazole Rash     cancor sores       Past Social History  Social History     Socioeconomic History    Marital status:    Tobacco Use    Smoking status: Never    Smokeless tobacco: Never   Vaping Use    Vaping status: Never Used   Substance and Sexual Activity    Alcohol use: Yes     Comment: Moderate    Drug use: Not Currently       Social History     Tobacco Use   Smoking Status Never  "  Smokeless Tobacco Never     Review of Systems:  Constitutional: Denies any fever or chills  Eyes: Denies any eye pain or blurry vision  ENT: Denies any ear pain or hearing loss  Cardiovascular: The heart rate is not slow, the heart rate is not fast  Respiratory: Denies any asthma/wheezing  Gastrointestinal: Denies any dori colored stools or fatty food intolerance  Genitourinary: Denies any blood in the urine or pelvic pain  Musculoskeletal: Denies any swelling in the joints or difficulty walking  Skin: Denies any skin lumps or skin lesions  Neurological: Denies any dizziness/tingling    Objective Data:  Last Recorded Vitals:  Vitals:    11/26/24 1554   BP: 138/84   Pulse: 95   SpO2: 96%   Weight: 91 kg (200 lb 9.6 oz)   Height: 1.676 m (5' 6\")       Last Labs:  CBC - 6/6/2024: 12:30 PM  6.0 15.9 228    48.0      CMP - 6/6/2024: 12:30 PM  8.7 6.9 19 --- 0.8   _ 4.1 26 47      PTT - 4/12/2024:  6:12 PM  1.0   11.2 32     TROPHS   Date/Time Value Ref Range Status   04/12/2024 08:29 PM <3 0 - 20 ng/L Final   04/12/2024 06:12 PM <3 0 - 20 ng/L Final   06/17/2023 10:44 PM <3 0 - 20 ng/L Final     Comment:     .  Less than 99th percentile of normal range cutoff-  Female and children under 18 years old <14 ng/L; Male <21 ng/L: Negative  Repeat testing should be performed if clinically indicated.   .  Female and children under 18 years old 14-50 ng/L; Male 21-50 ng/L:  Consistent with possible cardiac damage and possible increased clinical   risk. Serial measurements may help to assess extent of myocardial damage.   .  >50 ng/L: Consistent with cardiac damage, increased clinical risk and  myocardial infarction. Serial measurements may help assess extent of   myocardial damage.   .   NOTE: Children less than 1 year old may have higher baseline troponin   levels and results should be interpreted in conjunction with the overall   clinical context.   .  NOTE: Troponin I testing is performed using a different   testing " methodology at [facility] than at other   [facility]. Direct result comparisons should only   be made within the same method.     06/17/2023 09:50 PM <3 0 - 20 ng/L Final     Comment:     .  Less than 99th percentile of normal range cutoff-  Female and children under 18 years old <14 ng/L; Male <21 ng/L: Negative  Repeat testing should be performed if clinically indicated.   .  Female and children under 18 years old 14-50 ng/L; Male 21-50 ng/L:  Consistent with possible cardiac damage and possible increased clinical   risk. Serial measurements may help to assess extent of myocardial damage.   .  >50 ng/L: Consistent with cardiac damage, increased clinical risk and  myocardial infarction. Serial measurements may help assess extent of   myocardial damage.   .   NOTE: Children less than 1 year old may have higher baseline troponin   levels and results should be interpreted in conjunction with the overall   clinical context.   .  NOTE: Troponin I testing is performed using a different   testing methodology at [facility] than at other   [facility]. Direct result comparisons should only   be made within the same method.       HGBA1C   Date/Time Value Ref Range Status   06/06/2024 12:30 PM 5.3 see below % Final   05/24/2022 01:00 PM 5.6 % Final     Comment:          Diagnosis of Diabetes-Adults   Non-Diabetic: < or = 5.6%   Increased risk for developing diabetes: 5.7-6.4%   Diagnostic of diabetes: > or = 6.5%  .       Monitoring of Diabetes                Age (y)     Therapeutic Goal (%)   Adults:          >18           <7.0   Pediatrics:    13-18           <7.5                   7-12           <8.0                   0- 6            7.5-8.5   American Diabetes Association. Diabetes Care 33(S1), Jan 2010.       LDLCALC   Date/Time Value Ref Range Status   06/06/2024 12:30  <=99 mg/dL Final     Comment:                                 Near   Borderline      AGE      Desirable   Optimal    High     High     Very High     0-19 Y     0 - 109     ---    110-129   >/= 130     ----    20-24 Y     0 - 119     ---    120-159   >/= 160     ----      >24 Y     0 -  99   100-129  130-159   160-189     >/=190       VLDL   Date/Time Value Ref Range Status   06/06/2024 12:30 PM 24 0 - 40 mg/dL Final   07/24/2023 09:29 AM 50 0 - 40 mg/dL Final   12/13/2022 12:45 PM 55 0 - 40 mg/dL Final   05/24/2022 01:00 PM 51 0 - 40 mg/dL Final        Patient Medications:  Outpatient Encounter Medications as of 11/26/2024   Medication Sig Dispense Refill    atorvastatin (Lipitor) 20 mg tablet TAKE 1 TABLET BY MOUTH EVERY DAY 90 tablet 1    multivitamin tablet Take 1 tablet by mouth once daily.      omeprazole (PriLOSEC) 40 mg DR capsule Take 1 capsule (40 mg) by mouth once daily in the morning. Take before meals. Do not crush or chew. 30 capsule 5     No facility-administered encounter medications on file as of 11/26/2024.       Physical Exam:  General: alert, oriented and in no acute distress  HEENT: NC/AT; EOMI; PERRLA, external ear is normal  Neck: supple; trachea midline; no masses; no JVD  Chest: clear breath sounds bilaterally; no wheezing  Cardio: regular rhythm, S1S2 normal, no murmurs  Abdomen: Soft, non-tender, non-distension, no organomegaly  Extremities: no clubbing/cyanosis/edema  Neuro: Grossly intact     Psychiatric: Normal mood and affect     Past Cardiology Results (Last 3 Years):  EKG:  ECG 12 lead (Clinic Performed) 06/11/2024      ECG 12 Lead 04/12/2024    Echo:  Echo Results:  Transthoracic Echo (TTE) Complete 05/15/2024    [place] [address]  Phone [phone], Fax [phone]    TRANSTHORACIC ECHOCARDIOGRAM REPORT      Patient Name:      ISABEL TOMLIN         Aubrey Physician:    [id number] Andrey Reyes MD  Study Date:        5/15/2024            Ordering Provider:    [id number] GIBRAN RINCON  MRN/PID:           [id number]              Fellow:  Accession#:        [id number]         Nurse:  Date of Birth/Age: 1970 / 53 years Sonographer:          Jatinder Zheng [facility]  Gender:            M                    Additional Staff:  Height:            170.18 cm            Admit Date:  Weight:            86.64 kg             Admission Status:     Outpatient  BSA / BMI:         1.98 m2 / 29.92      Department Location:  [facility]  kg/m2  Blood Pressure: 132 /85 mmHg    Study Type:    TRANSTHORACIC ECHO (TTE) COMPLETE  Diagnosis/ICD: Atherosclerotic heart disease of native coronary artery without  angina pectoris-I25.10; Chest pain, unspecified-R07.9  CPT Codes:     Echo Complete w Full Doppler-[id number]  Study Detail: The following Echo studies were performed: 2D, M-Mode, Doppler and  color flow.      PHYSICIAN INTERPRETATION:  Left Ventricle: Left ventricular systolic function is normal, with an estimated ejection fraction of 55%. There are no regional wall motion abnormalities. The left ventricular cavity size is normal. Spectral Doppler shows a normal pattern of left ventricular diastolic filling.  Left Atrium: The left atrium is normal in size.  Right Ventricle: The right ventricle is normal in size. There is normal right ventricular global systolic function.  Right Atrium: The right atrium is normal in size.  Aortic Valve: The aortic valve is trileaflet. There is no evidence of aortic valve regurgitation. The peak instantaneous gradient of the aortic valve is 4.7 mmHg. The mean gradient of the aortic valve is 3.0 mmHg.  Mitral Valve: The mitral valve is normal in structure. There is no evidence of mitral valve regurgitation.  Tricuspid Valve: The tricuspid valve is structurally normal. No evidence of tricuspid regurgitation.  Pulmonic Valve: The pulmonic valve is structurally normal. There is no indication of pulmonic valve regurgitation.  Pericardium: There is no pericardial effusion noted.  Aorta: The aortic root is normal.  Systemic  Veins: The inferior vena cava appears to be of normal size.      CONCLUSIONS:  1. Left ventricular systolic function is normal with a 55% estimated ejection fraction.    QUANTITATIVE DATA SUMMARY:  2D MEASUREMENTS:  Normal Ranges:  Ao Root d:     3.00 cm   (2.0-3.7cm)  LAs:           3.30 cm   (2.7-4.0cm)  IVSd:          0.95 cm   (0.6-1.1cm)  LVPWd:         1.00 cm   (0.6-1.1cm)  LVIDd:         4.68 cm   (3.9-5.9cm)  LVIDs:         2.94 cm  LV Mass Index: 79.7 g/m2  LV % FS        37.2 %    LA VOLUME:  Normal Ranges:  LA Vol A4C:        15.0 ml   (22+/-6mL/m2)  LA Vol A2C:        18.2 ml  LA Vol BP:         16.6 ml  LA Vol Index A4C:  7.6ml/m2  LA Vol Index A2C:  9.2 ml/m2  LA Vol Index BP:   8.4 ml/m2  LA Area A4C:       8.9 cm2  LA Area A2C:       9.9 cm2  LA Major Axis A4C: 4.5 cm  LA Major Axis A2C: 4.6 cm  LA Volume Index:   7.3 ml/m2  LA Vol A4C:        14.4 ml  LA Vol A2C:        17.5 ml    LV SYSTOLIC FUNCTION BY 2D PLANIMETRY (MOD):  Normal Ranges:  EF-A4C View: 54.4 % (>=55%)  EF-A2C View: 48.5 %  EF-Biplane:  51.7 %    LV DIASTOLIC FUNCTION:  Normal Ranges:  MV Peak E:    0.60 m/s (0.7-1.2 m/s)  MV Peak A:    0.60 m/s (0.42-0.7 m/s)  E/A Ratio:    1.00     (1.0-2.2)  MV lateral e' 0.10 m/s  MV medial e'  0.09 m/s    MITRAL VALVE:  Normal Ranges:  MV DT: 165 msec (150-240msec)    AORTIC VALVE:  Normal Ranges:  AoV Vmax:                1.08 m/s (<=1.7m/s)  AoV Peak P.7 mmHg (<20mmHg)  AoV Mean PG:             3.0 mmHg (1.7-11.5mmHg)  LVOT Max Nitesh:            0.91 m/s (<=1.1m/s)  AoV VTI:                 22.50 cm (18-25cm)  LVOT VTI:                18.00 cm  LVOT Diameter:           1.80 cm  (1.8-2.4cm)  AoV Area, VTI:           2.04 cm2 (2.5-5.5cm2)  AoV Area,Vmax:           2.14 cm2 (2.5-4.5cm2)  AoV Dimensionless Index: 0.80      RIGHT VENTRICLE:  RV Basal 3.69 cm  RV Mid   2.43 cm  RV Major 7.6 cm  TAPSE:   22.6 mm  RV s'    0.11 m/s      [id number] Andrey Reyes MD  Electronically signed  "on 5/15/2024 at 3:41:51 PM        ** Final **     Cath:  No results found for this or any previous visit from the past 1095 days.    CV NCDR CATHPCI V5 COLLECTION FORM   Stress Test:  Stress Test 04/30/2024      Nuclear Stress Test 06/26/2023    Cardiac Imaging:  No results found for this or any previous visit from the past 1095 days.       Assessment/Plan     Mr. Luke Goldberg is a 54 y.o. year old non-smoker male with past medical history significant for hypertension, hyperlipidemia, GERD, obesity, neck pain, coming for assessment of atypical chest pain.      # Atypical chest pain  - Pain seems to be atypical.  - EKG shows normal sinus rhythm with no signs of ACS.   - Stress test was negative for ischemia  - The echocardiogram showed normal LVEF 55% with no wall motion abnormalities.   - CT calcium scoring is zero  - 48h holter monitor:  *48h holter monitor  *The predominant rhythm was Sinus with Pauses.  *The Maximum Heart Rate recorded was 123 bpm, 05/16 23:16:37, the Minimum Heart Rate recorded was 23 bpm, 05/17 05:24:43, and the Average Heart Rate was 75 bpm.  *The study included 18 Pauses. The Longest Pause was 4.8s, 05/16 08:10:47.  *There were 7 SVE beats with a burden of <1 %.  *There was 1 Patient Trigger.  *No signs of atrial fibrillation, atrial flutter, sinus pauses or malignant arrhythmias.  - EP team consult - Dr. Gaines: \"Likely bradycardia and is not pathological from a cardiac dysfunction standpoint. Patient's workup including stress test and echo have been normal. Since most of the episodes of sinus pauses and bradycardia were sleep they are likely related to undiagnosed sleep apnea. Patient does report that his swelling is stated he does snore at night. He also has very hectic sleep schedule and often will sleep between 2 to 3 AM to 8 AM\"   - Follow up in 6 months with new holter monitor after starting CPAP.     # ABY  - Patient aware that he needs CPAP for sleep. He is working with his insurance " for that.    # Hyperlipidemia  - Controlled by PCP.  - Keep home medication with Rosuvastatin 20mg daily.  - Counseled on healthy diet and regular exercise.      We have discussed the most common side effects of the prescribed medications, indications, drug interactions, risks, complications, and alternatives of medications/therapeutics were explained and discussed. The patient has been requested to monitor closely for any untoward side effects or complications of medications. The patient has been strongly advised to be compliant with the recommendations, all the questions and concerns have been addressed. The patient has been also instructed to call, to return sooner or to go to the emergency department if symptoms persist or get worsen. The patient voiced understanding and denies any further questions at this time.      This note was transcribed using the Dragon Dictation system. There may be grammatical, punctuation, or verbiage errors that occur with voice recognition programs.     Counseling greater than 50% of visit regarding all cardiac issues.     Thank you, Dr. Lau, for allowing me to participate in the care of this patient. Please do not hesitate to contact me with any further questions or concerns.     Rodrigo Doe MD  Cardiology

## 2024-12-12 ENCOUNTER — APPOINTMENT (OUTPATIENT)
Dept: PRIMARY CARE | Facility: CLINIC | Age: 54
End: 2024-12-12
Payer: COMMERCIAL

## 2025-05-27 ENCOUNTER — APPOINTMENT (OUTPATIENT)
Dept: CARDIOLOGY | Facility: CLINIC | Age: 55
End: 2025-05-27
Payer: COMMERCIAL